# Patient Record
Sex: MALE | Race: BLACK OR AFRICAN AMERICAN | Employment: UNEMPLOYED | URBAN - METROPOLITAN AREA
[De-identification: names, ages, dates, MRNs, and addresses within clinical notes are randomized per-mention and may not be internally consistent; named-entity substitution may affect disease eponyms.]

---

## 2021-06-28 ENCOUNTER — HOSPITAL ENCOUNTER (EMERGENCY)
Age: 61
Discharge: HOME | End: 2021-06-28

## 2021-06-28 ENCOUNTER — APPOINTMENT (OUTPATIENT)
Dept: GENERAL RADIOLOGY | Age: 61
End: 2021-06-28

## 2021-06-28 VITALS
OXYGEN SATURATION: 98 % | DIASTOLIC BLOOD PRESSURE: 79 MMHG | HEART RATE: 64 BPM | RESPIRATION RATE: 18 BRPM | SYSTOLIC BLOOD PRESSURE: 152 MMHG | TEMPERATURE: 97.5 F

## 2021-06-28 DIAGNOSIS — M25.561 CHRONIC PAIN OF BOTH KNEES: Primary | ICD-10-CM

## 2021-06-28 DIAGNOSIS — G89.29 CHRONIC PAIN OF BOTH KNEES: Primary | ICD-10-CM

## 2021-06-28 DIAGNOSIS — M25.562 CHRONIC PAIN OF BOTH KNEES: Primary | ICD-10-CM

## 2021-06-28 PROCEDURE — 73562 X-RAY EXAM OF KNEE 3: CPT

## 2021-06-28 PROCEDURE — 99283 EMERGENCY DEPT VISIT LOW MDM: CPT | Performed by: NURSE PRACTITIONER

## 2021-06-28 PROCEDURE — 99283 EMERGENCY DEPT VISIT LOW MDM: CPT

## 2021-06-28 RX ORDER — IBUPROFEN 600 MG/1
600 TABLET ORAL 3 TIMES DAILY PRN
Qty: 30 TABLET | Refills: 0 | Status: SHIPPED | OUTPATIENT
Start: 2021-06-28

## 2021-06-28 ASSESSMENT — ENCOUNTER SYMPTOMS
FEVER: 0
VOMITING: 0
CONFUSION: 0
NAUSEA: 0
CHILLS: 0
CHEST TIGHTNESS: 0
HEADACHES: 0
ABDOMINAL PAIN: 0
SHORTNESS OF BREATH: 0
COUGH: 0
DIZZINESS: 0

## 2021-07-14 ENCOUNTER — APPOINTMENT (OUTPATIENT)
Dept: GENERAL RADIOLOGY | Age: 61
End: 2021-07-14

## 2021-07-14 LAB
ALBUMIN SERPL-MCNC: 4.1 G/DL (ref 3.6–5.1)
ALBUMIN/GLOB SERPL: 1.1 {RATIO} (ref 1–2.4)
ALP SERPL-CCNC: 99 UNITS/L (ref 45–117)
ALT SERPL-CCNC: 25 UNITS/L
ANION GAP SERPL CALC-SCNC: 8 MMOL/L (ref 10–20)
AST SERPL-CCNC: 20 UNITS/L
BASOPHILS # BLD: 0.1 K/MCL (ref 0–0.3)
BASOPHILS NFR BLD: 2 %
BILIRUB SERPL-MCNC: 0.5 MG/DL (ref 0.2–1)
BUN SERPL-MCNC: 11 MG/DL (ref 6–20)
BUN/CREAT SERPL: 9 (ref 7–25)
CALCIUM SERPL-MCNC: 9.1 MG/DL (ref 8.4–10.2)
CHLORIDE SERPL-SCNC: 107 MMOL/L (ref 98–107)
CO2 SERPL-SCNC: 27 MMOL/L (ref 21–32)
CREAT SERPL-MCNC: 1.28 MG/DL (ref 0.67–1.17)
DEPRECATED RDW RBC: 47.2 FL (ref 39–50)
EOSINOPHIL # BLD: 0.2 K/MCL (ref 0–0.5)
EOSINOPHIL NFR BLD: 6 %
ERYTHROCYTE [DISTWIDTH] IN BLOOD: 14.1 % (ref 11–15)
FASTING DURATION TIME PATIENT: ABNORMAL H
GFR SERPLBLD BASED ON 1.73 SQ M-ARVRAT: 69 ML/MIN/1.73M2
GLOBULIN SER-MCNC: 3.9 G/DL (ref 2–4)
GLUCOSE SERPL-MCNC: 87 MG/DL (ref 65–99)
HCT VFR BLD CALC: 47.5 % (ref 39–51)
HGB BLD-MCNC: 15.2 G/DL (ref 13–17)
IMM GRANULOCYTES # BLD AUTO: 0 K/MCL (ref 0–0.2)
IMM GRANULOCYTES # BLD: 1 %
LYMPHOCYTES # BLD: 0.8 K/MCL (ref 1–4)
LYMPHOCYTES NFR BLD: 24 %
MCH RBC QN AUTO: 29 PG (ref 26–34)
MCHC RBC AUTO-ENTMCNC: 32 G/DL (ref 32–36.5)
MCV RBC AUTO: 90.6 FL (ref 78–100)
MONOCYTES # BLD: 0.4 K/MCL (ref 0.3–0.9)
MONOCYTES NFR BLD: 12 %
NEUTROPHILS # BLD: 1.8 K/MCL (ref 1.8–7.7)
NEUTROPHILS NFR BLD: 55 %
NRBC BLD MANUAL-RTO: 0 /100 WBC
PLATELET # BLD AUTO: 190 K/MCL (ref 140–450)
POTASSIUM SERPL-SCNC: 3.8 MMOL/L (ref 3.4–5.1)
PROT SERPL-MCNC: 8 G/DL (ref 6.4–8.2)
RBC # BLD: 5.24 MIL/MCL (ref 4.5–5.9)
SODIUM SERPL-SCNC: 138 MMOL/L (ref 135–145)
TROPONIN I SERPL HS-MCNC: <0.02 NG/ML
WBC # BLD: 3.3 K/MCL (ref 4.2–11)

## 2021-07-14 PROCEDURE — C9803 HOPD COVID-19 SPEC COLLECT: HCPCS

## 2021-07-14 PROCEDURE — 93005 ELECTROCARDIOGRAM TRACING: CPT | Performed by: EMERGENCY MEDICINE

## 2021-07-14 PROCEDURE — 71046 X-RAY EXAM CHEST 2 VIEWS: CPT

## 2021-07-14 PROCEDURE — 85025 COMPLETE CBC W/AUTO DIFF WBC: CPT | Performed by: NURSE PRACTITIONER

## 2021-07-14 PROCEDURE — 80053 COMPREHEN METABOLIC PANEL: CPT | Performed by: NURSE PRACTITIONER

## 2021-07-14 PROCEDURE — 84484 ASSAY OF TROPONIN QUANT: CPT | Performed by: NURSE PRACTITIONER

## 2021-07-14 PROCEDURE — 93010 ELECTROCARDIOGRAM REPORT: CPT | Performed by: INTERNAL MEDICINE

## 2021-07-14 PROCEDURE — 99284 EMERGENCY DEPT VISIT MOD MDM: CPT

## 2021-07-14 PROCEDURE — 36415 COLL VENOUS BLD VENIPUNCTURE: CPT

## 2021-07-14 ASSESSMENT — PAIN DESCRIPTION - PAIN TYPE: TYPE: CHEST PAIN

## 2021-07-14 ASSESSMENT — PAIN SCALES - GENERAL: PAINLEVEL_OUTOF10: 7

## 2021-07-15 ENCOUNTER — HOSPITAL ENCOUNTER (EMERGENCY)
Age: 61
Discharge: HOME | End: 2021-07-15
Attending: EMERGENCY MEDICINE

## 2021-07-15 VITALS
RESPIRATION RATE: 16 BRPM | HEART RATE: 70 BPM | BODY MASS INDEX: 33.14 KG/M2 | DIASTOLIC BLOOD PRESSURE: 104 MMHG | SYSTOLIC BLOOD PRESSURE: 161 MMHG | WEIGHT: 231.48 LBS | HEIGHT: 70 IN | TEMPERATURE: 98.4 F | OXYGEN SATURATION: 99 %

## 2021-07-15 DIAGNOSIS — B97.89 VIRAL RESPIRATORY ILLNESS: Primary | ICD-10-CM

## 2021-07-15 DIAGNOSIS — J98.8 VIRAL RESPIRATORY ILLNESS: Primary | ICD-10-CM

## 2021-07-15 LAB
ATRIAL RATE (BPM): 76
P AXIS (DEGREES): 38
PR-INTERVAL (MSEC): 150
QRS-INTERVAL (MSEC): 96
QT-INTERVAL (MSEC): 358
QTC: 402
R AXIS (DEGREES): -29
RAINBOW EXTRA TUBES HOLD SPECIMEN: NORMAL
REPORT TEXT: NORMAL
SARS-COV-2 RNA RESP QL NAA+PROBE: NOT DETECTED
SERVICE CMNT-IMP: NORMAL
SERVICE CMNT-IMP: NORMAL
T AXIS (DEGREES): 34
VENTRICULAR RATE EKG/MIN (BPM): 76

## 2021-07-15 PROCEDURE — 99284 EMERGENCY DEPT VISIT MOD MDM: CPT | Performed by: STUDENT IN AN ORGANIZED HEALTH CARE EDUCATION/TRAINING PROGRAM

## 2021-07-15 PROCEDURE — U0005 INFEC AGEN DETEC AMPLI PROBE: HCPCS | Performed by: STUDENT IN AN ORGANIZED HEALTH CARE EDUCATION/TRAINING PROGRAM

## 2021-07-15 ASSESSMENT — ENCOUNTER SYMPTOMS
HEADACHES: 0
SHORTNESS OF BREATH: 1
COUGH: 1
CHILLS: 0
BLOOD IN STOOL: 0
BACK PAIN: 0
CONFUSION: 0
WOUND: 0
ABDOMINAL PAIN: 0
STRIDOR: 0
TROUBLE SWALLOWING: 0
RHINORRHEA: 0
SORE THROAT: 0
BRUISES/BLEEDS EASILY: 0
RHINORRHEA: 1
FATIGUE: 0
EYE DISCHARGE: 0
CHEST TIGHTNESS: 0
DIARRHEA: 0
LIGHT-HEADEDNESS: 0
DIZZINESS: 0
VOMITING: 0
EYE PAIN: 0
AGITATION: 0
NAUSEA: 0
FEVER: 0
SEIZURES: 0
CHEST TIGHTNESS: 1
POLYDIPSIA: 0

## 2022-07-09 ENCOUNTER — HOSPITAL ENCOUNTER (INPATIENT)
Facility: CLINIC | Age: 62
LOS: 4 days | Discharge: HOME | DRG: 897 | End: 2022-07-13
Attending: EMERGENCY MEDICINE | Admitting: STUDENT IN AN ORGANIZED HEALTH CARE EDUCATION/TRAINING PROGRAM

## 2022-07-09 ENCOUNTER — APPOINTMENT (OUTPATIENT)
Dept: GENERAL RADIOLOGY | Facility: CLINIC | Age: 62
DRG: 897 | End: 2022-07-09
Attending: EMERGENCY MEDICINE

## 2022-07-09 DIAGNOSIS — S22.32XA CLOSED FRACTURE OF ONE RIB OF LEFT SIDE, INITIAL ENCOUNTER: ICD-10-CM

## 2022-07-09 DIAGNOSIS — R56.9 SEIZURE (H): ICD-10-CM

## 2022-07-09 DIAGNOSIS — E87.1 HYPONATREMIA: ICD-10-CM

## 2022-07-09 DIAGNOSIS — F10.939 ALCOHOL WITHDRAWAL SYNDROME WITH COMPLICATION (H): ICD-10-CM

## 2022-07-09 LAB
ALBUMIN SERPL-MCNC: 4.4 G/DL (ref 3.4–5)
ALP SERPL-CCNC: 60 U/L (ref 40–150)
ALT SERPL W P-5'-P-CCNC: 105 U/L (ref 0–70)
ANION GAP SERPL CALCULATED.3IONS-SCNC: 16 MMOL/L (ref 3–14)
AST SERPL W P-5'-P-CCNC: 125 U/L (ref 0–45)
ATRIAL RATE - MUSE: 77 BPM
BASOPHILS # BLD AUTO: 0 10E3/UL (ref 0–0.2)
BASOPHILS NFR BLD AUTO: 1 %
BILIRUB DIRECT SERPL-MCNC: 0.6 MG/DL (ref 0–0.2)
BILIRUB SERPL-MCNC: 1.9 MG/DL (ref 0.2–1.3)
BUN SERPL-MCNC: 7 MG/DL (ref 7–30)
CALCIUM SERPL-MCNC: 8.8 MG/DL (ref 8.5–10.1)
CHLORIDE BLD-SCNC: 91 MMOL/L (ref 94–109)
CK SERPL-CCNC: 194 U/L (ref 30–300)
CO2 SERPL-SCNC: 16 MMOL/L (ref 20–32)
CREAT SERPL-MCNC: 0.54 MG/DL (ref 0.66–1.25)
DIASTOLIC BLOOD PRESSURE - MUSE: NORMAL MMHG
EOSINOPHIL # BLD AUTO: 0 10E3/UL (ref 0–0.7)
EOSINOPHIL NFR BLD AUTO: 1 %
ERYTHROCYTE [DISTWIDTH] IN BLOOD BY AUTOMATED COUNT: 11.6 % (ref 10–15)
GFR SERPL CREATININE-BSD FRML MDRD: >90 ML/MIN/1.73M2
GLUCOSE BLD-MCNC: 144 MG/DL (ref 70–99)
HCT VFR BLD AUTO: 36 % (ref 40–53)
HGB BLD-MCNC: 12.8 G/DL (ref 13.3–17.7)
HOLD SPECIMEN: NORMAL
HOLD SPECIMEN: NORMAL
IMM GRANULOCYTES # BLD: 0 10E3/UL
IMM GRANULOCYTES NFR BLD: 1 %
INTERPRETATION ECG - MUSE: NORMAL
LYMPHOCYTES # BLD AUTO: 0.4 10E3/UL (ref 0.8–5.3)
LYMPHOCYTES NFR BLD AUTO: 10 %
MAGNESIUM SERPL-MCNC: 2.2 MG/DL (ref 1.6–2.3)
MAGNESIUM SERPL-MCNC: 2.3 MG/DL (ref 1.6–2.3)
MCH RBC QN AUTO: 33.9 PG (ref 26.5–33)
MCHC RBC AUTO-ENTMCNC: 35.6 G/DL (ref 31.5–36.5)
MCV RBC AUTO: 95 FL (ref 78–100)
MONOCYTES # BLD AUTO: 0.7 10E3/UL (ref 0–1.3)
MONOCYTES NFR BLD AUTO: 17 %
NEUTROPHILS # BLD AUTO: 3 10E3/UL (ref 1.6–8.3)
NEUTROPHILS NFR BLD AUTO: 70 %
NRBC # BLD AUTO: 0 10E3/UL
NRBC BLD AUTO-RTO: 0 /100
P AXIS - MUSE: 74 DEGREES
PHOSPHATE SERPL-MCNC: 2.6 MG/DL (ref 2.5–4.5)
PHOSPHATE SERPL-MCNC: 3.1 MG/DL (ref 2.5–4.5)
PLATELET # BLD AUTO: 120 10E3/UL (ref 150–450)
POTASSIUM BLD-SCNC: 3.4 MMOL/L (ref 3.4–5.3)
POTASSIUM BLD-SCNC: 3.6 MMOL/L (ref 3.4–5.3)
PR INTERVAL - MUSE: 170 MS
PROT SERPL-MCNC: 7.8 G/DL (ref 6.8–8.8)
QRS DURATION - MUSE: 92 MS
QT - MUSE: 426 MS
QTC - MUSE: 482 MS
R AXIS - MUSE: 75 DEGREES
RBC # BLD AUTO: 3.78 10E6/UL (ref 4.4–5.9)
SARS-COV-2 RNA RESP QL NAA+PROBE: NEGATIVE
SODIUM SERPL-SCNC: 123 MMOL/L (ref 133–144)
SYSTOLIC BLOOD PRESSURE - MUSE: NORMAL MMHG
T AXIS - MUSE: 74 DEGREES
VENTRICULAR RATE- MUSE: 77 BPM
WBC # BLD AUTO: 4.3 10E3/UL (ref 4–11)

## 2022-07-09 PROCEDURE — HZ2ZZZZ DETOXIFICATION SERVICES FOR SUBSTANCE ABUSE TREATMENT: ICD-10-PCS | Performed by: STUDENT IN AN ORGANIZED HEALTH CARE EDUCATION/TRAINING PROGRAM

## 2022-07-09 PROCEDURE — 36415 COLL VENOUS BLD VENIPUNCTURE: CPT | Performed by: EMERGENCY MEDICINE

## 2022-07-09 PROCEDURE — 85025 COMPLETE CBC W/AUTO DIFF WBC: CPT | Performed by: EMERGENCY MEDICINE

## 2022-07-09 PROCEDURE — 71101 X-RAY EXAM UNILAT RIBS/CHEST: CPT | Mod: LT

## 2022-07-09 PROCEDURE — C9803 HOPD COVID-19 SPEC COLLECT: HCPCS

## 2022-07-09 PROCEDURE — 83735 ASSAY OF MAGNESIUM: CPT | Performed by: STUDENT IN AN ORGANIZED HEALTH CARE EDUCATION/TRAINING PROGRAM

## 2022-07-09 PROCEDURE — 93005 ELECTROCARDIOGRAM TRACING: CPT

## 2022-07-09 PROCEDURE — U0005 INFEC AGEN DETEC AMPLI PROBE: HCPCS | Performed by: EMERGENCY MEDICINE

## 2022-07-09 PROCEDURE — 36415 COLL VENOUS BLD VENIPUNCTURE: CPT | Performed by: STUDENT IN AN ORGANIZED HEALTH CARE EDUCATION/TRAINING PROGRAM

## 2022-07-09 PROCEDURE — 99223 1ST HOSP IP/OBS HIGH 75: CPT | Mod: AI | Performed by: STUDENT IN AN ORGANIZED HEALTH CARE EDUCATION/TRAINING PROGRAM

## 2022-07-09 PROCEDURE — 96375 TX/PRO/DX INJ NEW DRUG ADDON: CPT

## 2022-07-09 PROCEDURE — 84132 ASSAY OF SERUM POTASSIUM: CPT | Performed by: INTERNAL MEDICINE

## 2022-07-09 PROCEDURE — 250N000011 HC RX IP 250 OP 636: Performed by: EMERGENCY MEDICINE

## 2022-07-09 PROCEDURE — 84100 ASSAY OF PHOSPHORUS: CPT | Performed by: EMERGENCY MEDICINE

## 2022-07-09 PROCEDURE — 99285 EMERGENCY DEPT VISIT HI MDM: CPT | Mod: 25

## 2022-07-09 PROCEDURE — 84100 ASSAY OF PHOSPHORUS: CPT | Performed by: STUDENT IN AN ORGANIZED HEALTH CARE EDUCATION/TRAINING PROGRAM

## 2022-07-09 PROCEDURE — 250N000013 HC RX MED GY IP 250 OP 250 PS 637: Performed by: STUDENT IN AN ORGANIZED HEALTH CARE EDUCATION/TRAINING PROGRAM

## 2022-07-09 PROCEDURE — 96374 THER/PROPH/DIAG INJ IV PUSH: CPT

## 2022-07-09 PROCEDURE — 120N000001 HC R&B MED SURG/OB

## 2022-07-09 PROCEDURE — 82248 BILIRUBIN DIRECT: CPT | Performed by: EMERGENCY MEDICINE

## 2022-07-09 PROCEDURE — 83735 ASSAY OF MAGNESIUM: CPT | Performed by: EMERGENCY MEDICINE

## 2022-07-09 PROCEDURE — 82310 ASSAY OF CALCIUM: CPT | Performed by: EMERGENCY MEDICINE

## 2022-07-09 PROCEDURE — 82550 ASSAY OF CK (CPK): CPT | Performed by: EMERGENCY MEDICINE

## 2022-07-09 PROCEDURE — 258N000003 HC RX IP 258 OP 636: Performed by: STUDENT IN AN ORGANIZED HEALTH CARE EDUCATION/TRAINING PROGRAM

## 2022-07-09 RX ORDER — IBUPROFEN 600 MG/1
600 TABLET, FILM COATED ORAL EVERY 6 HOURS
Status: DISCONTINUED | OUTPATIENT
Start: 2022-07-09 | End: 2022-07-13 | Stop reason: HOSPADM

## 2022-07-09 RX ORDER — AMOXICILLIN 250 MG
1 CAPSULE ORAL 2 TIMES DAILY PRN
Status: DISCONTINUED | OUTPATIENT
Start: 2022-07-09 | End: 2022-07-13 | Stop reason: HOSPADM

## 2022-07-09 RX ORDER — NALOXONE HYDROCHLORIDE 0.4 MG/ML
0.2 INJECTION, SOLUTION INTRAMUSCULAR; INTRAVENOUS; SUBCUTANEOUS
Status: DISCONTINUED | OUTPATIENT
Start: 2022-07-09 | End: 2022-07-13 | Stop reason: HOSPADM

## 2022-07-09 RX ORDER — GABAPENTIN 300 MG/1
600 CAPSULE ORAL EVERY 8 HOURS
Status: DISCONTINUED | OUTPATIENT
Start: 2022-07-13 | End: 2022-07-11

## 2022-07-09 RX ORDER — HYDROMORPHONE HCL IN WATER/PF 6 MG/30 ML
0.2 PATIENT CONTROLLED ANALGESIA SYRINGE INTRAVENOUS
Status: DISCONTINUED | OUTPATIENT
Start: 2022-07-09 | End: 2022-07-13 | Stop reason: HOSPADM

## 2022-07-09 RX ORDER — FLUMAZENIL 0.1 MG/ML
0.2 INJECTION, SOLUTION INTRAVENOUS
Status: DISCONTINUED | OUTPATIENT
Start: 2022-07-09 | End: 2022-07-13 | Stop reason: HOSPADM

## 2022-07-09 RX ORDER — ONDANSETRON 2 MG/ML
4 INJECTION INTRAMUSCULAR; INTRAVENOUS EVERY 6 HOURS PRN
Status: DISCONTINUED | OUTPATIENT
Start: 2022-07-09 | End: 2022-07-13 | Stop reason: HOSPADM

## 2022-07-09 RX ORDER — AMOXICILLIN 250 MG
2 CAPSULE ORAL 2 TIMES DAILY PRN
Status: DISCONTINUED | OUTPATIENT
Start: 2022-07-09 | End: 2022-07-13 | Stop reason: HOSPADM

## 2022-07-09 RX ORDER — FOLIC ACID 1 MG/1
1 TABLET ORAL DAILY
Status: DISCONTINUED | OUTPATIENT
Start: 2022-07-10 | End: 2022-07-13 | Stop reason: HOSPADM

## 2022-07-09 RX ORDER — IBUPROFEN 600 MG/1
600 TABLET, FILM COATED ORAL EVERY 6 HOURS PRN
Status: DISCONTINUED | OUTPATIENT
Start: 2022-07-09 | End: 2022-07-13 | Stop reason: HOSPADM

## 2022-07-09 RX ORDER — ACETAMINOPHEN 325 MG/1
650 TABLET ORAL EVERY 6 HOURS PRN
Status: DISCONTINUED | OUTPATIENT
Start: 2022-07-09 | End: 2022-07-13 | Stop reason: HOSPADM

## 2022-07-09 RX ORDER — DIAZEPAM 10 MG/2ML
5-10 INJECTION, SOLUTION INTRAMUSCULAR; INTRAVENOUS EVERY 30 MIN PRN
Status: DISCONTINUED | OUTPATIENT
Start: 2022-07-09 | End: 2022-07-13 | Stop reason: HOSPADM

## 2022-07-09 RX ORDER — SODIUM CHLORIDE 9 MG/ML
INJECTION, SOLUTION INTRAVENOUS CONTINUOUS
Status: DISCONTINUED | OUTPATIENT
Start: 2022-07-09 | End: 2022-07-11

## 2022-07-09 RX ORDER — OXYCODONE HYDROCHLORIDE 5 MG/1
5 TABLET ORAL EVERY 4 HOURS PRN
Status: DISCONTINUED | OUTPATIENT
Start: 2022-07-09 | End: 2022-07-13 | Stop reason: HOSPADM

## 2022-07-09 RX ORDER — GABAPENTIN 300 MG/1
900 CAPSULE ORAL EVERY 8 HOURS
Status: DISCONTINUED | OUTPATIENT
Start: 2022-07-10 | End: 2022-07-11

## 2022-07-09 RX ORDER — PROCHLORPERAZINE MALEATE 10 MG
10 TABLET ORAL EVERY 6 HOURS PRN
Status: DISCONTINUED | OUTPATIENT
Start: 2022-07-09 | End: 2022-07-13 | Stop reason: HOSPADM

## 2022-07-09 RX ORDER — HALOPERIDOL 5 MG/ML
2.5-5 INJECTION INTRAMUSCULAR EVERY 6 HOURS PRN
Status: DISCONTINUED | OUTPATIENT
Start: 2022-07-09 | End: 2022-07-13 | Stop reason: HOSPADM

## 2022-07-09 RX ORDER — LEVETIRACETAM 500 MG/1
500 TABLET ORAL 2 TIMES DAILY
Status: DISCONTINUED | OUTPATIENT
Start: 2022-07-09 | End: 2022-07-13 | Stop reason: HOSPADM

## 2022-07-09 RX ORDER — GABAPENTIN 600 MG/1
1200 TABLET ORAL ONCE
Status: COMPLETED | OUTPATIENT
Start: 2022-07-09 | End: 2022-07-09

## 2022-07-09 RX ORDER — OLANZAPINE 5 MG/1
5-10 TABLET, ORALLY DISINTEGRATING ORAL EVERY 6 HOURS PRN
Status: DISCONTINUED | OUTPATIENT
Start: 2022-07-09 | End: 2022-07-13 | Stop reason: HOSPADM

## 2022-07-09 RX ORDER — PROCHLORPERAZINE 25 MG
25 SUPPOSITORY, RECTAL RECTAL EVERY 12 HOURS PRN
Status: DISCONTINUED | OUTPATIENT
Start: 2022-07-09 | End: 2022-07-13 | Stop reason: HOSPADM

## 2022-07-09 RX ORDER — DIAZEPAM 5 MG
10 TABLET ORAL EVERY 30 MIN PRN
Status: DISCONTINUED | OUTPATIENT
Start: 2022-07-09 | End: 2022-07-13 | Stop reason: HOSPADM

## 2022-07-09 RX ORDER — GABAPENTIN 100 MG/1
100 CAPSULE ORAL EVERY 8 HOURS
Status: DISCONTINUED | OUTPATIENT
Start: 2022-07-17 | End: 2022-07-11

## 2022-07-09 RX ORDER — METHOCARBAMOL 500 MG/1
500 TABLET, FILM COATED ORAL EVERY 6 HOURS PRN
Status: ACTIVE | OUTPATIENT
Start: 2022-07-09 | End: 2022-07-12

## 2022-07-09 RX ORDER — ONDANSETRON 4 MG/1
4 TABLET, ORALLY DISINTEGRATING ORAL EVERY 6 HOURS PRN
Status: DISCONTINUED | OUTPATIENT
Start: 2022-07-09 | End: 2022-07-13 | Stop reason: HOSPADM

## 2022-07-09 RX ORDER — NALOXONE HYDROCHLORIDE 0.4 MG/ML
0.4 INJECTION, SOLUTION INTRAMUSCULAR; INTRAVENOUS; SUBCUTANEOUS
Status: DISCONTINUED | OUTPATIENT
Start: 2022-07-09 | End: 2022-07-13 | Stop reason: HOSPADM

## 2022-07-09 RX ORDER — MORPHINE SULFATE 4 MG/ML
4 INJECTION, SOLUTION INTRAMUSCULAR; INTRAVENOUS ONCE
Status: COMPLETED | OUTPATIENT
Start: 2022-07-09 | End: 2022-07-09

## 2022-07-09 RX ORDER — LIDOCAINE 40 MG/G
CREAM TOPICAL
Status: DISCONTINUED | OUTPATIENT
Start: 2022-07-09 | End: 2022-07-13 | Stop reason: HOSPADM

## 2022-07-09 RX ORDER — CLONIDINE HYDROCHLORIDE 0.1 MG/1
0.1 TABLET ORAL EVERY 8 HOURS
Status: DISCONTINUED | OUTPATIENT
Start: 2022-07-09 | End: 2022-07-11

## 2022-07-09 RX ORDER — DIAZEPAM 10 MG/2ML
5 INJECTION, SOLUTION INTRAMUSCULAR; INTRAVENOUS EVERY 5 MIN PRN
Status: DISCONTINUED | OUTPATIENT
Start: 2022-07-09 | End: 2022-07-13 | Stop reason: HOSPADM

## 2022-07-09 RX ORDER — ACETAMINOPHEN 325 MG/1
975 TABLET ORAL EVERY 8 HOURS
Status: DISCONTINUED | OUTPATIENT
Start: 2022-07-10 | End: 2022-07-13 | Stop reason: HOSPADM

## 2022-07-09 RX ORDER — MULTIPLE VITAMINS W/ MINERALS TAB 9MG-400MCG
1 TAB ORAL DAILY
Status: DISCONTINUED | OUTPATIENT
Start: 2022-07-10 | End: 2022-07-13 | Stop reason: HOSPADM

## 2022-07-09 RX ORDER — ACETAMINOPHEN 650 MG/1
650 SUPPOSITORY RECTAL EVERY 6 HOURS PRN
Status: DISCONTINUED | OUTPATIENT
Start: 2022-07-09 | End: 2022-07-13 | Stop reason: HOSPADM

## 2022-07-09 RX ORDER — POLYETHYLENE GLYCOL 3350 17 G/17G
17 POWDER, FOR SOLUTION ORAL DAILY PRN
Status: DISCONTINUED | OUTPATIENT
Start: 2022-07-09 | End: 2022-07-13 | Stop reason: HOSPADM

## 2022-07-09 RX ORDER — LIDOCAINE 4 G/G
1 PATCH TOPICAL EVERY 24 HOURS
Status: DISCONTINUED | OUTPATIENT
Start: 2022-07-09 | End: 2022-07-13 | Stop reason: HOSPADM

## 2022-07-09 RX ORDER — GABAPENTIN 300 MG/1
300 CAPSULE ORAL EVERY 8 HOURS
Status: DISCONTINUED | OUTPATIENT
Start: 2022-07-15 | End: 2022-07-11

## 2022-07-09 RX ADMIN — LIDOCAINE 1 PATCH: 560 PATCH PERCUTANEOUS; TOPICAL; TRANSDERMAL at 23:03

## 2022-07-09 RX ADMIN — DIAZEPAM 5 MG: 5 INJECTION, SOLUTION INTRAMUSCULAR; INTRAVENOUS at 17:59

## 2022-07-09 RX ADMIN — OXYCODONE HYDROCHLORIDE 5 MG: 5 TABLET ORAL at 21:19

## 2022-07-09 RX ADMIN — SODIUM CHLORIDE: 9 INJECTION, SOLUTION INTRAVENOUS at 23:00

## 2022-07-09 RX ADMIN — CLONIDINE HYDROCHLORIDE 0.1 MG: 0.1 TABLET ORAL at 23:00

## 2022-07-09 RX ADMIN — IBUPROFEN 600 MG: 600 TABLET ORAL at 23:07

## 2022-07-09 RX ADMIN — MORPHINE SULFATE 4 MG: 4 INJECTION, SOLUTION INTRAMUSCULAR; INTRAVENOUS at 17:57

## 2022-07-09 RX ADMIN — GABAPENTIN 1200 MG: 600 TABLET, FILM COATED ORAL at 23:01

## 2022-07-09 RX ADMIN — LEVETIRACETAM 500 MG: 500 TABLET, FILM COATED ORAL at 20:35

## 2022-07-09 ASSESSMENT — ACTIVITIES OF DAILY LIVING (ADL)
ADLS_ACUITY_SCORE: 35
DRESSING/BATHING_DIFFICULTY: NO
FALL_HISTORY_WITHIN_LAST_SIX_MONTHS: NO
WALKING_OR_CLIMBING_STAIRS_DIFFICULTY: NO
ADLS_ACUITY_SCORE: 35
DIFFICULTY_EATING/SWALLOWING: NO
TOILETING_ISSUES: NO
CHANGE_IN_FUNCTIONAL_STATUS_SINCE_ONSET_OF_CURRENT_ILLNESS/INJURY: YES
WEAR_GLASSES_OR_BLIND: NO
CONCENTRATING,_REMEMBERING_OR_MAKING_DECISIONS_DIFFICULTY: NO
DOING_ERRANDS_INDEPENDENTLY_DIFFICULTY: NO
ADLS_ACUITY_SCORE: 35

## 2022-07-09 ASSESSMENT — ENCOUNTER SYMPTOMS
ARTHRALGIAS: 1
SEIZURES: 1

## 2022-07-09 NOTE — ED NOTES
"[facility]  ED Nurse Handoff Report    ED Chief complaint: Seizures      ED Diagnosis:   Final diagnoses:   Seizure (H)       Code Status: Full Code    Allergies: No Known Allergies    Patient Story: Patient was at the airport heading back home to [place] when he had a seizure. Bystanders lowered him to the ground.  Focused Assessment:  Patient is confused and doesn't remember events leading up to event, the event, or after the event. He's very forgetful. He has redness in BLE left >right. He endorses left rib pain. There are scattered bruises over his body especially LUE.     Treatments and/or interventions provided: Labs drawn and medications given. Monitoring patient for any events. Continuous vital signs done.  Patient's response to treatments and/or interventions: Tolerating well. Patient has a tremor that appears to be worsening. Valium given and bear hugger with warm air placed on patient.    To be done/followed up on inpatient unit:  Monitor for seizures and alcohol withdrawal. Follow plan of care.    Does this patient have any cognitive concerns?: Short term memory loss    Activity level - Baseline/Home:  Independent  Activity Level - Current:   Stand with Assist    Patient's Preferred language: English   Needed?: No    Isolation: None  Infection: Not Applicable  Patient tested for COVID 19 prior to admission: YES  Bariatric?: No    Vital Signs:   Vitals:    07/09/22 1620 07/09/22 1639 07/09/22 1700   BP: (!) 153/90 133/80 130/84   Pulse: 95 89 79   Resp: 18     Temp: 98.3  F (36.8  C)     TempSrc: Oral     SpO2: 96% 96% 94%   Weight: 61.2 kg (135 lb)     Height: 1.803 m (5' 11\")         Cardiac Rhythm:     Was the PSS-3 completed:   Yes  What interventions are required if any?               Family Comments: N/A  OBS brochure/video discussed/provided to patient/family: No              Name of person given brochure if not patient:               Relationship to patient:     For the " majority of the shift this patient's behavior was Yellow for confusion and forgetfulness.   Behavioral interventions performed were N/A.    ED NURSE PHONE NUMBER: [phone]

## 2022-07-09 NOTE — ED NOTES
Bed: ED09  Expected date:   Expected time:   Means of arrival:   Comments:  Abhijeet 522 61 M seizure alert ETA [id number]

## 2022-07-09 NOTE — ED PROVIDER NOTES
History   Chief Complaint:  Seizures     The history is provided by the EMS personnel.      Jose Stovall is a 61 year old male with history of epilepsy who presents via EMS with seizure. EMS states that the patient was at the [place] airport about to board his flight home to [place] when he started seizing. They report that he was lowered to the floor by bystanders. They state that upon their arrival the patient had stopped seizing after 5 minutes and was post-ictal with right-sided facial weakness. They add he was aphasic. They report that once they got the patient into the vehicle he started to come around. They add that his blood sugar was 128 and his speech was slurred. They add that once he arrived to the ED he was oriented x4. They note that he has a history of epilepsy and is not taking any medications for this. The patient adds that his last seizure was 7 years ago. He adds that he develops theses seizures when he quits drinking. The patient adds that he developed left rib pain which is exacerbated by a slight cough.    Review of Systems   Musculoskeletal: Positive for arthralgias (Left rib).   Neurological: Positive for seizures.   All other systems reviewed and are negative.    Allergies:  No Known Allergies    Medications:  The patient is currently on no regular medications.    Past Medical History:     Epilepsy    Past Surgical History:    No pertinent surgical history.     Social History:  Patient is from [place], [place]  Presents to ED via EMS    Physical Exam     Patient Vitals for the past 24 hrs:   BP Temp Temp src Pulse Resp SpO2 Height Weight   07/09/22 2250 123/77 99.5  F (37.5  C) Oral 67 18 98 % -- --   07/09/22 2228 (!) 116/90 -- -- -- -- -- -- --   07/09/22 2226 -- -- -- 69 -- 98 % -- --   07/09/22 1830 (!) 140/90 -- -- 68 -- 98 % -- --   07/09/22 1800 (!) 144/87 -- -- 77 -- 99 % -- --   07/09/22 1730 (!) 143/95 -- -- 74 -- 100 % -- --   07/09/22 1700 130/84 -- -- 79 -- 94 % -- --  "  07/09/22 1639 133/80 -- -- 89 -- 96 % -- --   07/09/22 1620 (!) 153/90 98.3  F (36.8  C) Oral 95 18 96 % 1.803 m (5' 11\") 61.2 kg (135 lb)     Physical Exam   General: Alert, No distress. Nontoxic appearance.  Head: No signs of trauma.   Mouth/Throat: Oropharynx moist. Bite wound to left-side of tongue  Eyes: Conjunctivae are normal. Pupils are equal..   Neck: Normal range of motion.    CV: Appears well perfused.  Resp:No respiratory distress.   MSK: Normal range of motion. No obvious deformity. Left-sided rib pain  Neuro: The patient is alert and interactive. MEDRANO. Speech normal. GCS 15  Skin: No lesion or sign of trauma noted. Erythema over left leg including toes and left lower leg  Psych: normal mood and affect. behavior is normal.     Emergency Department Course   ECG  ECG taken at 1711, ECG read at 1718  No pacemaker  Sinus rhythm with premature atrial complexes with aberrant conduction   Rate 77 bpm. MS interval 170 ms. QRS duration 92 ms. QT/QTc 426/482 ms. P-R-T axes 74 75 74.     Imaging:  Ribs XR, unilat 3 views + PA chest,  left   Final Result   IMPRESSION:    1. The cardiomediastinal silhouette and pulmonary vasculature appear normal.   2. Small amount of streaky opacity in the left lung base consistent with atelectasis or infiltrate.   3. Minimally displaced lateral left eighth and ninth rib fractures. Small amount of left pleural fluid. No pneumothorax.        Report per radiology    Laboratory:  Labs Ordered and Resulted from Time of ED Arrival to Time of ED Departure   BASIC METABOLIC PANEL - Abnormal       Result Value    Sodium 123 (*)     Potassium 3.4      Chloride 91 (*)     Carbon Dioxide (CO2) 16 (*)     Anion Gap 16 (*)     Urea Nitrogen 7      Creatinine 0.54 (*)     Calcium 8.8      Glucose 144 (*)     GFR Estimate >90     CBC WITH PLATELETS AND DIFFERENTIAL - Abnormal    WBC Count 4.3      RBC Count 3.78 (*)     Hemoglobin 12.8 (*)     Hematocrit 36.0 (*)     MCV 95      MCH 33.9 (*)     " MCHC 35.6      RDW 11.6      Platelet Count 120 (*)     % Neutrophils 70      % Lymphocytes 10      % Monocytes 17      % Eosinophils 1      % Basophils 1      % Immature Granulocytes 1      NRBCs per 100 WBC 0      Absolute Neutrophils 3.0      Absolute Lymphocytes 0.4 (*)     Absolute Monocytes 0.7      Absolute Eosinophils 0.0      Absolute Basophils 0.0      Absolute Immature Granulocytes 0.0      Absolute NRBCs 0.0     HEPATIC FUNCTION PANEL - Abnormal    Bilirubin Total 1.9 (*)     Bilirubin Direct 0.6 (*)     Protein Total 7.8      Albumin 4.4      Alkaline Phosphatase 60       (*)      (*)    CK TOTAL - Normal         MAGNESIUM - Normal    Magnesium 2.3     PHOSPHORUS - Normal    Phosphorus 2.6     COVID-19 VIRUS (CORONAVIRUS) BY PCR - Normal    SARS CoV2 PCR Negative        Emergency Department Course:     Reviewed:  I reviewed nursing notes, vitals, past medical history and Care Everywhere    Assessments:  1730 I obtained history and examined the patient as noted above.   1845 I rechecked the patient and explained findings.     Consults:  1857 I consulted with Dr. Mccracken, hospitalist, who accepts the patient's admission.    Interventions:  1757 Morphine, 4 mg, IV  1759 Diazepam, 5 mg, IV  2035 Levetiracetam, 500 mg, PO  2119 Oxycodone, 5 mg, PO    Disposition:  The patient was admitted to the hospital under the care of Dr. Mccracken.     Impression & Plan     Medical Decision Making:  Jose Stovall is a 61 year old male with a PMH significant for a seizure disorder who presents for evaluation of a spell consistent with a seizure. A broad differential diagnosis was considered for the seizure today including alcohol withdrawal, medicine non-compliance, low or high levels of anti-epileptic, intracranial bleed, stroke, tumor, hypoglycemia, cerebral edema, metabolic derangement, cardiac arrhythmia, etc.  The patient has no signs of concerning etiologies of seizure or seizure-mimics at  this time.    The head to toe trauma exam is negative except for his left-sided tongue biting and left-sided chest wall pain and x-ray that indicates 2 rib fracture; there are no signs of serious sequelae of trauma at this time such as spinal fractures, dislocations, etc.  Because the seizure was likely secondary to alcohol withdrawal, the patient will be admitted to the hospital for further evaluation and treatment      Diagnosis:    ICD-10-CM    1. Seizure (H)  R56.9    2. Alcohol withdrawal syndrome with complication (H)  F10.239    3. Closed fracture of one rib of left side, initial encounter  S22.32XA    4. Hyponatremia  E87.1      Scribe Disclosure:  I, Ra Jorgensen, am serving as a scribe at 5:22 PM on 7/9/2022 to document services personally performed by Lopez Hoskins MD based on my observations and the provider's statements to me.        Lopez Hoskins MD  07/10/22 0056

## 2022-07-09 NOTE — ED NOTES
RN notes left lower leg redness when performing EKG. RN also notes right top of foot redness.    Patient complaints of left sided rib pain when sat up after 12 lead EKG performed. RN inspected ribs. No bruising, redness, deformity or injury seen. Patient tolerated palpation to upper and lower lateral ribs, but complains of pain with palpation over middle lateral ribs.    MD Joing updated.

## 2022-07-09 NOTE — ED TRIAGE NOTES
EMS REPORT:    Patient was traveling from [place]. Left this morning.  Patent was at [place] airport when he was witnessed to have a seizure. Patient was lowered to the ground by a bystander. No injuries to tongue or mouth. No loss of control of bowel or bladder. Patient was initially somnolent upon Fire/EMS arrival. Patient had global aphasia and was postictal. Patient had large amounts of oral secretions. Patient had right sided facial weakness. Patient was loaded into ambulance where he started to come around. . 18ga PIV in LFA. History of epilepsy as adult. No longer on anti-seizure medications. No seizures for years.    Patient is now Awake alert and oriented to person, place, time and situation. Patient moves all four extremities equally bilaterally. Pupils are equal round and reactive to light. Patient denies any pain or lightheadedness. Denies any dizziness. Patient denies any urinary symptoms. Denies any chronic health problems.      Triage Assessment     Row Name 07/09/22 0244       Triage Assessment (Adult)    Airway WDL WDL       Respiratory WDL    Respiratory WDL WDL       Skin Circulation/Temperature WDL    Skin Circulation/Temperature WDL WDL       Cardiac WDL    Cardiac WDL WDL       Peripheral/Neurovascular WDL    Peripheral Neurovascular WDL WDL       Cognitive/Neuro/Behavioral WDL    Cognitive/Neuro/Behavioral WDL WDL

## 2022-07-09 NOTE — H&P
[facility]    History and Physical - Hospitalist Service       Date of Admission:  7/9/2022    Assessment & Plan      Jose Stovall is a 61 year old male admitted on 7/9/2022.     Seizure, likely 2/2 EtOH withdrawal  Hx of EtOH withdrawal seizure  EtOH use disorder  - inpatient  - CIWA with diazepam  - vitamins  - keppra x7 days  -CT head    Elevated LFTs, likely EtOH hepatitis  - RUQ ultrasound  - recheck LFTs    Left 8th and 9th rib fracture, acute  - PRN robaxin, oxycodone, lidocaine patch  - IS, pulmonary toilet  - monitor  - PT/OT    Thrombocytopenia, mild  - suspect related to liver dz  - recheck    Hyponatremia  - check urine studies  - recheck in AM   - gentle IVF       Diet:   regular  DVT Prophylaxis: Pneumatic Compression Devices  Colón Catheter: Not present  Central Lines: None  Cardiac Monitoring: None  Code Status:   FULL    Clinically Significant Risk Factors Present on Admission         # Hyponatremia: Na = 123 mmol/L (Ref range: 133 - 144 mmol/L) on admission, will monitor as appropriate        # Thrombocytopenia: Plts = 120 10e3/uL (Ref range: 150 - 450 10e3/uL) on admission, will monitor for bleeding           Disposition Plan         The patient's care was discussed with the Bedside Nurse, Patient and ED Team.    Corey Mccracken MD  Hospitalist Service  [facility]  Securely message with the Vocera Web Console (learn more here)  Text page via [employer] Paging/Directory         ______________________________________________________________________    Chief Complaint   Seizure and chest pain    History is obtained from the patient, electronic health record and emergency department physician    History of Present Illness   Jose Stovall is a 61 year old male who has a history of alcohol withdrawal seizures presents to the hospital on 7/9/2022 after having a seizure at the [place] airport during layover.    Patient was visiting [place] from the  [facility] to be with family for the week.  On his flight home he decided not to drink that day.  While he was in the airport he was feeling very stressed because he thought he had lost his bag.  The next thing he knows he was on the ground being lifted by by EMS.  He reportedly was seen to have a shaking spell lasting approximately 5 minutes.  No loss of bowel or bladder function, but he did bite his tongue.  He was notably postictal for most of the EMS ride, but on arrival to the emergency department he was alert and oriented.    Reports in typically drinks 2-3 tall boys daily.  Denies any hard liquor use.  Denies any other illicit substance use.    On interview, he endorses left-sided chest pain that is worse with deep breathing.  He does not feel like he is withdrawing from any substances currently.    Review of Systems    The 10 point Review of Systems is negative other than noted in the HPI or here.     Past Medical History    I have reviewed this patient's medical history and updated it with pertinent information if needed.   Past Medical History:   Diagnosis Date     Seizure disorder (H)        Past Surgical History   I have reviewed this patient's surgical history and updated it with pertinent information if needed.  History reviewed. No pertinent surgical history.    Social History   I have reviewed this patient's social history and updated it with pertinent information if needed.  Social History     Tobacco Use     Smoking status: Current Every Day Smoker     Packs/day: 0.25     Types: Cigarettes     Smokeless tobacco: Never Used   Substance Use Topics     Drug use: Never       Family History     No reported history of sudden cardiac death    Prior to Admission Medications   None     Allergies   No Known Allergies    Physical Exam   Vital Signs: Temp: 98.3  F (36.8  C) Temp src: Oral BP: (!) 140/90 Pulse: 68   Resp: 18 SpO2: 98 % O2 Device: None (Room air)    Weight: 135 lbs 0 oz    Constitutional:  Awake, alert, cooperative, no apparent cardiopulmonary distress.  Eyes: Conjunctiva and pupils examined and normal.  HEENT: Moist mucous membranes, normal dentition.  Respiratory: Clear to auscultation bilaterally, no crackles or wheezing.  Cardiovascular: Regular rate and rhythm, normal S1 and S2, and no murmur noted.  GI: Soft, non-distended, non-tender, normal bowel sounds.  Lymph/Hematologic: No anterior cervical or supraclavicular adenopathy.  Skin: No rashes, no cyanosis, no edema noted on exposed skin.  Musculoskeletal: No joint swelling, erythema or tenderness. No gross bony abnormalities  Neurologic: Cranial nerves 2-12 grossly intact, normal strength and sensation.  Psychiatric: Alert, oriented to person, place and time, no obvious anxiety or depression.      Data   Data reviewed today: I reviewed all medications, new labs and imaging results over the last 24 hours. I personally reviewed the EKG tracing showing nsr and the chest x-ray image(s) showing left 8 an 9 rib fracture.    Recent Labs   Lab 07/09/22  1637   WBC 4.3   HGB 12.8*   MCV 95   *   *   POTASSIUM 3.4   CHLORIDE 91*   CO2 16*   BUN 7   CR 0.54*   ANIONGAP 16*   JEAN-CLAUDE 8.8   *   ALBUMIN 4.4   PROTTOTAL 7.8   BILITOTAL 1.9*   ALKPHOS 60   *   *     Recent Results (from the past 24 hour(s))   Ribs XR, unilat 3 views + PA chest,  left    Narrative    EXAM: XR RIBS and CHEST LT 3VW  LOCATION: [facility]  DATE/TIME: 7/9/2022 6:21 PM    INDICATION: Pain after fall  COMPARISON: None.      Impression    IMPRESSION:   1. The cardiomediastinal silhouette and pulmonary vasculature appear normal.  2. Small amount of streaky opacity in the left lung base consistent with atelectasis or infiltrate.  3. Minimally displaced lateral left eighth and ninth rib fractures. Small amount of left pleural fluid. No pneumothorax.

## 2022-07-10 ENCOUNTER — APPOINTMENT (OUTPATIENT)
Dept: PHYSICAL THERAPY | Facility: CLINIC | Age: 62
DRG: 897 | End: 2022-07-10
Attending: STUDENT IN AN ORGANIZED HEALTH CARE EDUCATION/TRAINING PROGRAM

## 2022-07-10 ENCOUNTER — APPOINTMENT (OUTPATIENT)
Dept: ULTRASOUND IMAGING | Facility: CLINIC | Age: 62
DRG: 897 | End: 2022-07-10
Attending: STUDENT IN AN ORGANIZED HEALTH CARE EDUCATION/TRAINING PROGRAM

## 2022-07-10 ENCOUNTER — APPOINTMENT (OUTPATIENT)
Dept: MRI IMAGING | Facility: CLINIC | Age: 62
DRG: 897 | End: 2022-07-10
Attending: PSYCHIATRY & NEUROLOGY

## 2022-07-10 LAB
ALBUMIN SERPL-MCNC: 3.5 G/DL (ref 3.4–5)
ALP SERPL-CCNC: 44 U/L (ref 40–150)
ALT SERPL W P-5'-P-CCNC: 77 U/L (ref 0–70)
ANION GAP SERPL CALCULATED.3IONS-SCNC: 9 MMOL/L (ref 3–14)
AST SERPL W P-5'-P-CCNC: 72 U/L (ref 0–45)
BILIRUB SERPL-MCNC: 2.7 MG/DL (ref 0.2–1.3)
BUN SERPL-MCNC: 9 MG/DL (ref 7–30)
CALCIUM SERPL-MCNC: 8.6 MG/DL (ref 8.5–10.1)
CHLORIDE BLD-SCNC: 98 MMOL/L (ref 94–109)
CO2 SERPL-SCNC: 22 MMOL/L (ref 20–32)
CREAT SERPL-MCNC: 0.59 MG/DL (ref 0.66–1.25)
ERYTHROCYTE [DISTWIDTH] IN BLOOD BY AUTOMATED COUNT: 11.8 % (ref 10–15)
GFR SERPL CREATININE-BSD FRML MDRD: >90 ML/MIN/1.73M2
GLUCOSE BLD-MCNC: 110 MG/DL (ref 70–99)
HCT VFR BLD AUTO: 32.3 % (ref 40–53)
HGB BLD-MCNC: 11.4 G/DL (ref 13.3–17.7)
MAGNESIUM SERPL-MCNC: 2.2 MG/DL (ref 1.6–2.3)
MCH RBC QN AUTO: 34.3 PG (ref 26.5–33)
MCHC RBC AUTO-ENTMCNC: 35.3 G/DL (ref 31.5–36.5)
MCV RBC AUTO: 97 FL (ref 78–100)
OSMOLALITY SERPL: 265 MMOL/KG (ref 280–301)
OSMOLALITY UR: 260 MMOL/KG (ref 100–1200)
PHOSPHATE SERPL-MCNC: 3.3 MG/DL (ref 2.5–4.5)
PLATELET # BLD AUTO: 111 10E3/UL (ref 150–450)
POTASSIUM BLD-SCNC: 3.5 MMOL/L (ref 3.4–5.3)
PROT SERPL-MCNC: 6.9 G/DL (ref 6.8–8.8)
RBC # BLD AUTO: 3.32 10E6/UL (ref 4.4–5.9)
SODIUM SERPL-SCNC: 129 MMOL/L (ref 133–144)
SODIUM UR-SCNC: 24 MMOL/L
WBC # BLD AUTO: 4.3 10E3/UL (ref 4–11)

## 2022-07-10 PROCEDURE — 80053 COMPREHEN METABOLIC PANEL: CPT | Performed by: STUDENT IN AN ORGANIZED HEALTH CARE EDUCATION/TRAINING PROGRAM

## 2022-07-10 PROCEDURE — 76705 ECHO EXAM OF ABDOMEN: CPT

## 2022-07-10 PROCEDURE — 83735 ASSAY OF MAGNESIUM: CPT | Performed by: STUDENT IN AN ORGANIZED HEALTH CARE EDUCATION/TRAINING PROGRAM

## 2022-07-10 PROCEDURE — 97530 THERAPEUTIC ACTIVITIES: CPT | Mod: GP | Performed by: PHYSICAL THERAPIST

## 2022-07-10 PROCEDURE — A9585 GADOBUTROL INJECTION: HCPCS | Performed by: STUDENT IN AN ORGANIZED HEALTH CARE EDUCATION/TRAINING PROGRAM

## 2022-07-10 PROCEDURE — 258N000003 HC RX IP 258 OP 636: Performed by: STUDENT IN AN ORGANIZED HEALTH CARE EDUCATION/TRAINING PROGRAM

## 2022-07-10 PROCEDURE — 84100 ASSAY OF PHOSPHORUS: CPT | Performed by: STUDENT IN AN ORGANIZED HEALTH CARE EDUCATION/TRAINING PROGRAM

## 2022-07-10 PROCEDURE — 83930 ASSAY OF BLOOD OSMOLALITY: CPT | Performed by: INTERNAL MEDICINE

## 2022-07-10 PROCEDURE — 99233 SBSQ HOSP IP/OBS HIGH 50: CPT | Performed by: INTERNAL MEDICINE

## 2022-07-10 PROCEDURE — 85027 COMPLETE CBC AUTOMATED: CPT | Performed by: STUDENT IN AN ORGANIZED HEALTH CARE EDUCATION/TRAINING PROGRAM

## 2022-07-10 PROCEDURE — 97116 GAIT TRAINING THERAPY: CPT | Mod: GP | Performed by: PHYSICAL THERAPIST

## 2022-07-10 PROCEDURE — 250N000013 HC RX MED GY IP 250 OP 250 PS 637: Performed by: STUDENT IN AN ORGANIZED HEALTH CARE EDUCATION/TRAINING PROGRAM

## 2022-07-10 PROCEDURE — 120N000001 HC R&B MED SURG/OB

## 2022-07-10 PROCEDURE — 97161 PT EVAL LOW COMPLEX 20 MIN: CPT | Mod: GP | Performed by: PHYSICAL THERAPIST

## 2022-07-10 PROCEDURE — 255N000002 HC RX 255 OP 636: Performed by: STUDENT IN AN ORGANIZED HEALTH CARE EDUCATION/TRAINING PROGRAM

## 2022-07-10 PROCEDURE — 84300 ASSAY OF URINE SODIUM: CPT | Performed by: INTERNAL MEDICINE

## 2022-07-10 PROCEDURE — 999N000157 HC STATISTIC RCP TIME EA 10 MIN

## 2022-07-10 PROCEDURE — 70553 MRI BRAIN STEM W/O & W/DYE: CPT

## 2022-07-10 PROCEDURE — 83935 ASSAY OF URINE OSMOLALITY: CPT | Performed by: INTERNAL MEDICINE

## 2022-07-10 PROCEDURE — 36415 COLL VENOUS BLD VENIPUNCTURE: CPT | Performed by: INTERNAL MEDICINE

## 2022-07-10 PROCEDURE — 94150 VITAL CAPACITY TEST: CPT

## 2022-07-10 RX ORDER — GADOBUTROL 604.72 MG/ML
6 INJECTION INTRAVENOUS ONCE
Status: COMPLETED | OUTPATIENT
Start: 2022-07-10 | End: 2022-07-10

## 2022-07-10 RX ADMIN — IBUPROFEN 600 MG: 600 TABLET ORAL at 17:51

## 2022-07-10 RX ADMIN — THIAMINE HCL TAB 100 MG 100 MG: 100 TAB at 08:10

## 2022-07-10 RX ADMIN — MELATONIN 5 MG TABLET 5 MG: at 00:11

## 2022-07-10 RX ADMIN — OXYCODONE HYDROCHLORIDE 5 MG: 5 TABLET ORAL at 15:44

## 2022-07-10 RX ADMIN — FOLIC ACID 1 MG: 1 TABLET ORAL at 08:10

## 2022-07-10 RX ADMIN — LEVETIRACETAM 500 MG: 500 TABLET, FILM COATED ORAL at 08:10

## 2022-07-10 RX ADMIN — CLONIDINE HYDROCHLORIDE 0.1 MG: 0.1 TABLET ORAL at 22:48

## 2022-07-10 RX ADMIN — IBUPROFEN 600 MG: 600 TABLET ORAL at 05:35

## 2022-07-10 RX ADMIN — GABAPENTIN 900 MG: 300 CAPSULE ORAL at 14:28

## 2022-07-10 RX ADMIN — CLONIDINE HYDROCHLORIDE 0.1 MG: 0.1 TABLET ORAL at 08:10

## 2022-07-10 RX ADMIN — SODIUM CHLORIDE: 9 INJECTION, SOLUTION INTRAVENOUS at 18:38

## 2022-07-10 RX ADMIN — LEVETIRACETAM 500 MG: 500 TABLET, FILM COATED ORAL at 20:34

## 2022-07-10 RX ADMIN — ACETAMINOPHEN 975 MG: 325 TABLET ORAL at 00:10

## 2022-07-10 RX ADMIN — GADOBUTROL 6 ML: 604.72 INJECTION INTRAVENOUS at 21:23

## 2022-07-10 RX ADMIN — IBUPROFEN 600 MG: 600 TABLET ORAL at 11:56

## 2022-07-10 RX ADMIN — OXYCODONE HYDROCHLORIDE 5 MG: 5 TABLET ORAL at 21:38

## 2022-07-10 RX ADMIN — GABAPENTIN 900 MG: 300 CAPSULE ORAL at 21:38

## 2022-07-10 RX ADMIN — MULTIPLE VITAMINS W/ MINERALS TAB 1 TABLET: TAB at 08:10

## 2022-07-10 RX ADMIN — ACETAMINOPHEN 975 MG: 325 TABLET ORAL at 15:44

## 2022-07-10 RX ADMIN — IBUPROFEN 600 MG: 600 TABLET ORAL at 22:48

## 2022-07-10 RX ADMIN — CLONIDINE HYDROCHLORIDE 0.1 MG: 0.1 TABLET ORAL at 14:28

## 2022-07-10 RX ADMIN — POLYETHYLENE GLYCOL 3350 17 G: 17 POWDER, FOR SOLUTION ORAL at 15:58

## 2022-07-10 RX ADMIN — ACETAMINOPHEN 975 MG: 325 TABLET ORAL at 08:10

## 2022-07-10 RX ADMIN — MELATONIN 5 MG TABLET 5 MG: at 22:48

## 2022-07-10 RX ADMIN — GABAPENTIN 900 MG: 300 CAPSULE ORAL at 05:35

## 2022-07-10 ASSESSMENT — ACTIVITIES OF DAILY LIVING (ADL)
ADLS_ACUITY_SCORE: 20

## 2022-07-10 NOTE — PROGRESS NOTES
RT met with patient to perform NIF and VC.     NIF: -50 cmH2O  VC: 4.08 L     Both were performed with good effort.    RT to continue to follow    Alexander Willis, RT on 7/10/2022 at 2:50 PM

## 2022-07-10 NOTE — PROVIDER NOTIFICATION
"MD Notification    Notified Person: MD    Notified Person Name: Cielo Callaway MD     Notification Date/Time: 710/22 at 1856    Notification Interaction: [facility] Answering Service     Purpose of Notification: \"Can you order MRI of brain\"     Orders Received: MRI and EEG ordered.     Comments:       "

## 2022-07-10 NOTE — PLAN OF CARE
Goal Outcome Evaluation:    Summary: Witnessed [place] at the airport. Hyponatremia   DATE & TIME: 7/10/2022 1731-6224  Cognitive Concerns/ Orientation : A&Ox4; forgetful.   BEHAVIOR & AGGRESSION TOOL COLOR: Green  CIWA SCORE: 1,1   ABNL VS/O2: VSS on RA  MOBILITY: Assist of 1 with GB, walker  PAIN MANAGMENT: Scheduled Tylenol, Advil, Lidoderm patch (patch free period), has PRN Oxycodone available for Left ribs pain. C/o rib pain only when elevating arms or repositioning in bed  DIET: Regular  BOWEL/BLADDER: Continent  ABNL LAB/BG: ALT 77 AST 72 -  CXR showed Left 8th and 9th rib fractures. DRAIN/DEVICES: PIV  TELEMETRY RHYTHM: NA  SKIN: Bruises, scabs, redness to BLE  TESTS/PROCEDURES: Abdominal US   D/C DAY/GOALS/PLACE: Pending  OTHER IMPORTANT INFO: Seizure precautions in place

## 2022-07-10 NOTE — PROGRESS NOTES
07/10/22 1458   Quick Adds   Type of Visit Initial PT Evaluation   Living Environment   People in Home alone   Current Living Arrangements hotel/motel   Home Accessibility stairs to enter home   Number of Stairs, Main Entrance 10   Stair Railings, Main Entrance railings safe and in good condition   Transportation Anticipated family or friend will provide   Living Environment Comments Per pt. he used to live with his mother who has since passed away.  States she had WW and SEC but sister has taken over all of the mother's estate and pt. has blocked sister's calls as they are at odds.  Pt. reports he was here visiting his ex-wife, who is his best friend, and her family in [place] for the last week [facility] [place] [facility] [place]  In [place] he has lived in a motel with a flight of 10 steps for the last two months.  States he is going to call to see if he can move to the bottom floor.  Also states he can stay with son and daughter-in-law in [place] when he returns.  Has two sons who live in same town and assist with getting groceries as pt. doesn't drive.   Self-Care   Usual Activity Tolerance good   Current Activity Tolerance moderate   Regular Exercise Yes   Activity/Exercise Type walking   Exercise Amount/Frequency daily   Equipment Currently Used at Home none   Fall history within last six months yes   Number of times patient has fallen within last six months 2  (Prior to admit at airport with seizure; subsequent L rib 8th and 9th fxs.  Reports several months ago also fell on bottom of steps.  Is unsure what happened.  Reports he always uses rail and is always concerned with steps.)   Activity/Exercise/Self-Care Comment Independent with ambulation without AD; I with ADL's   General Information   Onset of Illness/Injury or Date of Surgery 07/09/22   Referring Physician Corey Mccracken MD   Patient/Family Therapy Goals Statement (PT) Plans to have local family he was visiting take him to the  airport when he is discharged.  Plans to discharge directly to airport and return to [place]; possibly to son and daughter-in-laws home.   Pertinent History of Current Problem (include personal factors and/or comorbidities that impact the POC) Jose Stovall is a 61 year old male with history of epilepsy who presents via EMS with seizure. EMS states that the patient was at the [place] airport about to board his flight home to [place] when he started seizing. They report that he was lowered to the floor by bystanders. They state that upon their arrival the patient had stopped seizing after 5 minutes and was post-ictal with right-sided facial weakness. They add he was aphasic. They report that once they got the patient into the vehicle he started to come around. They add that his blood sugar was 128 and his speech was slurred. They add that once he arrived to the ED he was oriented x4. They note that he has a history of epilepsy and is not taking any medications for this. The patient adds that his last seizure was 7 years ago. He adds that he develops theses seizures when he quits drinking. The patient adds that he developed left rib pain which is exacerbated by a slight cough   Existing Precautions/Restrictions fall;seizures   General Observations Lying in bed; on his cellphone; agreeable to participate   Cognition   Affect/Mental Status (Cognition) WFL   Orientation Status (Cognition) oriented x 4   Follows Commands (Cognition) follows one-step commands;75-90% accuracy   Safety Deficit (Cognition) impulsivity;insight into deficits/self-awareness   Pain Assessment   Patient Currently in Pain Yes, see Vital Sign flowsheet  (L ribs)   Integumentary/Edema   Integumentary/Edema no deficits were identifed   Posture    Posture Forward head position   Range of Motion (ROM)   Range of Motion ROM is WFL   Strength (Manual Muscle Testing)   Strength Comments antigravity movement noted throughout limbs.  Generally L/E's 4/5.   Painful at ribs with limb movement.   Bed Mobility   Comment, (Bed Mobility) Supine to sit with SBA.Increased effort/pain   Transfers   Comment, (Transfers) Sit to stand with CGA and increased effort/pain   Gait/Stairs (Locomotion)   Comment, (Gait/Stairs) Ambulated 10' for eval without AD with close CGA to occasional slight Min A with lateral deviation.  CGA 10' with WW.   Balance   Balance Comments Compromised, especially without AD minimally though noticably; requiring pt. to reach for wall occasionally.  More steady with WW. Still requiring CGA.   Sensory Examination   Sensory Perception patient reports no sensory changes   Coordination   Coordination Comments mild incoordination with occasional lateral deviation; pt. feels like he occasionall deviates to L.  Therapist observed occasional right deviation.  Unsure if this is close to baseline given ETOH history and its effects.   Muscle Tone   Muscle Tone no deficits were identified   Clinical Impression   Criteria for Skilled Therapeutic Intervention Yes, treatment indicated   PT Diagnosis (PT) Mildly Impaired functional mobility   Influenced by the following impairments Fatigue, deconditioning, balance deficits   Functional limitations due to impairments decreased independence and endurance in functional mobility   Clinical Presentation (PT Evaluation Complexity) Evolving/Changing   Clinical Presentation Rationale per clinical judgment   Clinical Decision Making (Complexity) low complexity   Planned Therapy Interventions (PT) bed mobility training;balance training;gait training;home exercise program;patient/family education;stair training;transfer training;home program guidelines   Anticipated Equipment Needs at Discharge (PT) walker, rolling  (pt. concerned as he does not have insurance; educated that it would be with entire hospital bill)   Risk & Benefits of therapy have been explained evaluation/treatment results reviewed;care plan/treatment goals  reviewed;risks/benefits reviewed;current/potential barriers reviewed;participants voiced agreement with care plan;participants included;patient   PT Discharge Planning   PT Discharge Recommendation (DC Rec) home with assist;home with home care physical therapy   PT Rationale for DC Rec Patient is currently mobilizing with close CGA and mild balance deficits especially when not using WW but slight deficits even when using.  Given pain in left ribs contributing to slight imbalance, would highly recommend 24/7 assist at this time for all mobilit.  Feel pt. is at risk for falling again without CGA support.  Pt. reports he can stay with son and daughter-in-law when he gets back to [place]. States son works; is unsure if daughter-in-law works.  Pt. is also planning on calling the motel he is staying at to see if he can be moved to main floor as he is currently on 2nd floor with 10 steps to climb.  Pt. would need wc and transfer assistance to safely travel at airport/airplane at this time.Discussed pt. returning to friends/famiy's home in [place] for a short time to allow for healing of ribs.  Pt. states this is not an option on his end as he doesn't want to outstay his welcome even though they would likely be good with him doing that.  If discharged to home, recommend home PT to assess pt. in his own setting.   PT Brief overview of current status '   Physical Therapy Goals   PT Frequency Daily   PT Predicted Duration/Target Date for Goal Attainment 07/13/22   PT: Bed Mobility Independent;Supine to/from sit;Within precautions   PT: Transfers Modified independent;Bed to/from chair;Sit to/from stand;Assistive device   PT: Gait Modified independent;Rolling walker;150 feet   PT: Stairs 10 stairs;Modified independent;Rail on left

## 2022-07-10 NOTE — CARE PLAN
Admission    Patient arrives to room 618 via cart from ED.  Care plan note: A&Ox4; forgetful. VSS on RA. Up with assist of 1, gait belt, walker. C/o Left rib pain, managing with scheduled Tylenol, Advil, Lidoderm patch. Has PRN Oxycodone available q4hrs    Inpatient nursing criteria listed below were met:    Did you put disposition on whiteboard and in sticky note: Yes  Full skin assessment done (add LDA if skin issue present) :Yes  Isolation education started/completed NA  Patient allergies verified with patient: Yes  Fall Risk? (Care plan updated, Education given and documented) Yes  Primary Care Plan initiated: Yes  Home medications documented in belongings flowsheet: NA  Patient belongings documented in belongings flowsheet: Yes  Reminder note (belongings/ medications) placed in discharge instructions:Yes  Admission profile/ required documentation complete: Yes  If patient is a 72 hour hold/Commitment are belongings removed from room and locked up? NA

## 2022-07-10 NOTE — CONSULTS
[facility]    Neurology Consultation    Jose Stovall MRN# [id number]   YOB: 1960 Age: 61 year old    Code Status:Full Code   Date of Admission: 7/9/2022  Date of Consult:07/10/2022                                                                                       Assessment and Plan:                                         #.   -- Mr. Stovall is a 61-year-old gentleman who admits to alcohol abuse, indicated that he has 3-4 drinks per day, usually beer, with a history for alcohol related seizures who indicated that he had not had a drink for about 24 hours when while at the airport he had a witnessed generalized seizure and was brought to the ED.  #.   -- Alcohol withdrawal seizure, also, contributing factors, patient indicated that he was sleep deprived, and presents with hyponatremia.    --- Rib fractures, traumatic, related to the above.  --Hyponatremia elevated LFTs, concerns regarding alcohol hepatitis.  #.   -- Recommendations  ---MRI of the brain with and without contrast  --Continue seizure precautions  --- Continue Keppra 500 mg twice daily.  --- Continue slow correction of hyponatremia    --- Vitamin replacement  --Alcohol withdrawal seizure appropriate protocol.  --Vital signs with neurochecks every 4 hours.    --Routine EEG.        Reason for consult: This neurological consultation was requested by Dr. Solo to assess this patient who presented with a generalized seizure, suspected alcohol withdrawal related.       Chief Complaint:   The information was obtained from the patient, and review of his health records.  Assessment indicated that he was at the [facility] when he apparently had a witnessed, generalized seizure and was brought to [facility] ED by EMS and admitted on 7/9/2022 .       History of Present Illness:   This patient is a 61 year old male who who indicated that he was at the [facility] to board a flight home to [place] and while there, he  started having seizure activity and was lowered to the floor by bystanders, witnesses stated that he stopped seizing after 5 minutes and was postictal with right-sided facial weakness.  Speech was noted to be slurred following the spell and when he arrived to the ED his mental status had improved.  The patient recalled waiting at the airport and  the next thing he knew he was in the ED here at [facility].  He admitted that he does drink alcohol regularly, at least 3-4 beers daily but because of his planned trip he had abstained the day before.  He has a history for seizures, he stated that these were probably alcohol related, but that his last seizure was about 78 years ago.  He indicated that at some point, and his history, he had been on Keppra but it had been discontinued by his physicians.  He has a history for childhood seizures, no history for ischemic strokes or meningitis or encephalitis or traumatic brain injury.  While in the ED he was complaining of left-sided chest pains and x-rays revealed a small amount of streaky opacity in the left lung base consistent with atelectasis, or infiltrate, minimally displaced lateral left eighth and ninth rib fractures were also noted.  Noncontrast CT scan of the head was not done in the ED.  Routine labs revealed sodium 129, potassium 5.3, calcium 8.6, magnesium 2.2, CK1 94, , AST 72, ammonia level was pending       Past Medical History:     Past Medical History:   Diagnosis Date     Seizure disorder (H)          Past Surgical History:   History reviewed. No pertinent surgical history.       Social History:     Social History     Socioeconomic History     Marital status:      Spouse name: None     Number of children: None     Years of education: None     Highest education level: None   Tobacco Use     Smoking status: Current Every Day Smoker     Packs/day: 0.25     Types: Cigarettes     Smokeless tobacco: Never Used   Substance and Sexual Activity      Drug use: Never     Patient denies smoking, history for daily alcohol use, denies illicit drugs use       Family History:   History reviewed. No pertinent family history.  Reviewed and not felt to be contributory.        Home Medications:     Prior to Admission Medications   Prescriptions Last Dose Informant Patient Reported? Taking?   MELATONIN PO   Yes Yes   Sig: Take 2 tablets by mouth At Bedtime      Facility-Administered Medications: None          Allergy:   No Known Allergies       Inpatient Medications:   Scheduled Meds:    acetaminophen  975 mg Oral Q8H     cloNIDine  0.1 mg Oral Q8H     folic acid  1 mg Oral Daily     [START ON 7/17/2022] gabapentin  100 mg Oral Q8H     [START ON 7/15/2022] gabapentin  300 mg Oral Q8H     [START ON 7/13/2022] gabapentin  600 mg Oral Q8H     gabapentin  900 mg Oral Q8H     ibuprofen  600 mg Oral Q6H     levETIRAcetam  500 mg Oral BID     lidocaine  1 patch Transdermal Q24H     lidocaine   Transdermal Q8H TERRANCE     multivitamin w/minerals  1 tablet Oral Daily     sodium chloride (PF)  3 mL Intracatheter Q8H     thiamine  100 mg Oral Daily     PRN Meds: acetaminophen **OR** acetaminophen, diazepam, diazepam **OR** diazepam, flumazenil, OLANZapine zydis **OR** haloperidol lactate, HYDROmorphone, ibuprofen, lidocaine 4%, lidocaine (buffered or not buffered), melatonin, methocarbamol, naloxone **OR** naloxone **OR** naloxone **OR** naloxone, ondansetron **OR** ondansetron, oxyCODONE, polyethylene glycol, prochlorperazine **OR** prochlorperazine **OR** prochlorperazine, senna-docusate **OR** senna-docusate, sodium chloride (PF)        Review of Systems    The Review of Systems is negative other than noted in the HPI  CONSTITUTIONAL: negative for fever, chills, change in weight  INTEGUMENTARY/SKIN: no rash or obvious new lesions  ENT/MOUTH: no sore throat, new sinus pain or nasal drainage, no neck mass noted  RESP: No pleuretic pain, No cough, no hemoptysis, No SOB   CV: negative  "for chest pain, palpitations or peripheral edema  GI: no nausea, vomiting, change in stools  : no dysuria or hematuria  MUSCULOSKELETAL: no myalgias, arthralgias or join efffusion  ENDOCRINE: no history of polyuria, polydyspsia or symptoms of thyroid dysfunction  PSYCHIATRIC: no change in mood stable  LYMPHATIC: no new lymphadenopathy  HEME: no bleeding or easy bruisability  NEURO: see HPI       Physical Exam:   Physical Exam   Vitals:  Height:5' 11\"  Weight:135 lbs 0 oz   Temp: 99.5  F (37.5  C) Temp src: Oral BP: 113/75 Pulse: 61   Resp: 16 SpO2: 98 % O2 Device: None (Room air)    General Appearance:  No acute distress he was normocephalic and had no signs of acute head or neck trauma.  Neuro:       Mental Status Exam:   He was drowsy, but easily aroused and became interactive with fluent speech and was oriented to time place and person, complaining of chest discomfort.  Fund of knowledge was appropriate.       Cranial Nerves:   Pupils are equal and briskly reactive to light, there was no ptosis, visual fields are intact to confrontation with full extraocular movements without nystagmoid movements and cranial nerves II through XII are intact.          Motor: There was normal muscle bulk, and tone, in all 4 extremities and there were no dystonic movements or tremors.  He had excellent strength, 5/5, proximally and distally in both the upper and lower extremities.          Reflexes: 2+/4 in biceps, triceps and brachioradialis, at the patella and ankles bilaterally, plantars were downgoing.       Sensory: Except for decreased appreciation to vibratory sensation, primary modalities were intact.                   Coordination:   There was no dysmetria to finger-to-nose or heel-to-shin testing       Gait: Deferred as the patient was complaining of chest discomfort  Neck: no nuchal rigidity, normal thyroid. No carotid bruits.    Cardiovascular: Regular rate and rhythm, no m/r/g  Lungs: Clear to auscultation  Abdomen: " Soft, not tender, not distended  Extremities: No clubbing, no cyanosis, no edema       Data:   ROUTINE IP LABS   CBC RESULTS:     Recent Labs   Lab 07/10/22  1124 07/09/22  1637   WBC 4.3 4.3   RBC 3.32* 3.78*   HGB 11.4* 12.8*   HCT 32.3* 36.0*   * 120*     Basic Metabolic Panel:   Recent Labs   Lab Test 07/10/22  1124 07/09/22  2247 07/09/22  1637   *  --  123*   POTASSIUM 3.5 3.6 3.4   CHLORIDE 98  --  91*   CO2 22  --  16*   BUN 9  --  7   CR 0.59*  --  0.54*   *  --  144*   JEAN-CLAUDE 8.6  --  8.8     Liver panel:  Recent Labs   Lab Test 07/10/22  1124 07/09/22  1637   PROTTOTAL 6.9 7.8   ALBUMIN 3.5 4.4   BILITOTAL 2.7* 1.9*   ALKPHOS 44 60   AST 72* 125*   ALT 77* 105*     Lipid Profile:No lab results found.  Thyroid Panel:No lab results found.   Vitamin B12: No lab results found.        IMAGING:   All imaging studies were reviewed personally    MRI brain:   -- Pending    Time: 60 minutes evaluation and management.  More than 50% of the time was spent in counseling and directing care.  Thank you for allowing me to participate in the care of this patient, do not hesitate to contact me if I can be of further assistance you.    Cielo Callaway M.D.  [facility], [facility].  Office [phone]

## 2022-07-10 NOTE — PHARMACY-ADMISSION MEDICATION HISTORY
Pharmacy Medication History  Admission medication history interview status for the 7/9/2022  admission is complete. See EPIC admission navigator for prior to admission medications     Location of Interview: Patient room  Medication history sources: Patient    Significant changes made to the medication list:  Added Melatonin.    In the past week, patient estimated taking medication this percent of the time: 50-90% due to other    Additional medication history information:   Patient reported that he did not know what strength Melatonin that he takes.    Medication reconciliation completed by provider prior to medication history? No    Time spent in this activity: 10 minutes    Prior to Admission medications    Medication Sig Last Dose Taking? Auth Provider Long Term End Date   MELATONIN PO Take 2 tablets by mouth At Bedtime  Yes Unknown, Entered By History         The information provided in this note is only as accurate as the sources available at the time of update(s)

## 2022-07-10 NOTE — PLAN OF CARE
Goal Outcome Evaluation:  Plan of Care Reviewed With: patient   Overall Patient Progress: improving  Cognitive Concerns/ Orientation : A&Ox4; forgetful.   BEHAVIOR & AGGRESSION TOOL COLOR: Green  CIWA SCORE: 3, 1 (mild tremors and anxiety )  ABNL VS/O2: VSS on RA  MOBILITY: Assist of 1 with GB, walker  PAIN MANAGMENT: Scheduled Tylenol, Advil, Lidoderm patch, and PRN Oxycodone for Left ribs pain. C/o left hand pain; ice pack applied; helpful.  DIET: Regular(NPO now for 8 hrs for abdominal US)  BOWEL/BLADDER: Continent  ABNL LAB/BG:   CXR showed Left 8th and 9th rib fractures   DRAIN/DEVICES: PIV  TELEMETRY RHYTHM: NA  SKIN: Bruises, scabs, redness to BLE  TESTS/PROCEDURES: Abdominal US in am  D/C DAY/GOALS/PLACE: Pending  OTHER IMPORTANT INFO: Seizure precautions in place

## 2022-07-10 NOTE — UTILIZATION REVIEW
"  Admission Status; Secondary Review Determination     Admission Date: 7/9/2022  4:15 PM      Under the authority of the Utilization Management Committee, the utilization review process indicated a secondary review on the above patient.  The review outcome is based on review of the medical records, discussions with staff, and applying clinical experience noted on the date of the review.        (x)      Inpatient Status Appropriate - This patient's medical care is consistent with medical management for inpatient care and reasonable inpatient medical practice.      () Observation Status Appropriate - This patient does not meet hospital inpatient criteria and is placed in observation status. If this patient's primary payer is Medicare and was admitted as an inpatient, Condition Code 44 should be used and patient status changed to \"observation\".   () Admission Status NOT Appropriate - This patient's medical care is not consistent with medical management for Inpatient or Observation Status.          RATIONALE FOR DETERMINATION   Jose Stovall is a 61 year old male with history including alcohol use d/o with h/o withdrawal seizure, who presented 7/9/2022 with seizure and found with sodium 123 and with left sided rib fracture's.  He has been treated for acute alcohol withdrawals with gabapentin and clonidine.  He has required diazepam as well.  He is on Keppra for seizures.  Currently on continuous IV fluid to treat his hyponatremia.  He is expected to require a prolonged hospital stay.  Due to this patient's complex past medical history, hyponatremia requiring IV fluids, alcohol withdrawals requiring gabapentin, clonidine, and diazepam, seizures requiring Keppra, and expectation of prolonged hospital stay, it is appropriate to have him admitted to the hospital as an inpatient.      The severity of illness, intensity of service provided, expected LOS and risk for adverse outcome make the care complex, high risk and " appropriate for hospital admission.        The information on this document is developed by the utilization review team in order for the business office to ensure compliance.  This only denotes the appropriateness of proper admission status and does not reflect the quality of care rendered.         The definitions of Inpatient Status and Observation Status used in making the determination above are those provided in the CMS Coverage Manual, Chapter 1 and Chapter 6, section 70.4.      Sincerely,     Mirza Toure D.O.  Utilization Review/ Case Management  [facility].

## 2022-07-10 NOTE — PROGRESS NOTES
"CLINICAL NUTRITION SERVICES BRIEF NOTE  Nutrition Admission Risk Screen Received -   Have you recently lost weight without trying in the last 6 months ? - \"yes 24-33 pounds (states lost 30 lbs over past 3 year\"\"  Have you been eating poorly due to a decreased appetite ?- \"No\"    New Findings  - Weight history reviewed -     61.2 kg (135 lb) 07/09/22   63.5 kg (140 lb) 05/26/2021     60.8 kg (134 lb) 02/15/2019    Weights appear relatively stable for the past 3 years.     Intervention  - Doesn't meet criteria for full nutrition assessment. Will review again at [facility], unless consulted.     Elsa Braswell RD, LD  [facility], [facility], [facility]  Pager: [phone]  Weekend Pager: [phone]      "

## 2022-07-10 NOTE — PROGRESS NOTES
"[facility]    Internal Medicine Hospitalist Progress Note  07/10/2022  I evaluated patient on the above date.    Jesus Solo Jr., MD  [phone] (p)  Text Page  Vocera        Assessment & Plan New actions/orders today (07/10/2022) are underlined.    Jose Stovall is a 61 year old male with history including alcohol use d/o with h/o withdrawal seizure, who presented 7/9/2022 with seizure and found with sodium 123 and with left sided rib fracture's,     Seizure, suspect 2/2 EtOH withdrawal with hyponatremia.  Hx of EtOH withdrawal seizures.  EtOH use disorder.  * Prior h/o seizures 7-8 years ago; however, per pt this seemed to be associated with stopping drinking; was previously on meds; no seizures for 7 years.  * Presented from [facility] [place] with seizure (was going back home to [place], [place], after visiting family in [place]). Typically drinks 2-3 tall boys daily; did not drink on the day of admit.  * Started on CIWA on admit. Started on 7d course of levetiracetam on admit.  - Continue CIWA with PRN diazepam.  - Continue gabapentin with taper per protocol.  - Continue scheduled clonidine per protocol.  - Continue vitamins.  - Continue levetiracetam.  - Check head CT.  - Neurology consult, appreciate help.  - Treat hyponatremia as noted.    Hyponatremia, possibly related to chronic EtOH (\"beer potomania\").  * Sodium 123 on admit. Started on NS on admit.  Recent Labs   Lab 07/09/22  1637   *   - Check serum osmol, urine osmol, urine sodium.  - Continue NS at 100 ml/hr.  - Monitor BMP.    Elevated LFTs, suspect alcoholic hepatitis.  * LFT's on admit 7/9 showed TBili 1.9, ALP 60, , .  Recent Labs   Lab 07/09/22  1637   ALBUMIN 4.4   BILITOTAL 1.9*   *   *   ALKPHOS 60   - RUQ US results pending.  - Monitor LFT's.    Acute left 8th and 9th rib fracture, suspect traumatic related to seizure.  * Per [place]. Pt c/o left " chest pain in the ED. Rib/chest x-ray 7/9 showed small amount of streaky opacity in the left lung base consistent with atelectasis or infiltrate; minimally displaced lateral left eighth and ninth rib fractures; small amount of left pleural fluid; no pneumothorax.  * Started on scheduled acetaminophen, scheduled ibuprofen, lidocaine patch on admit.  - Check head CT.  - Continue acetaminophen 975 mg q8h; ibuprofen 600 mg q6h; lidocaine patch 4% q24h; PRN acetaminophen; PRN methocarbamol 500 mg q6h; PRN oxycodone 5 mg q4h, PRN IV hydromorphone 0.2 mg q2h; minimize opioids as able.  - Continue IS, pulmonary toilet.  - PT, OT consulted.    HTN, possibly related to alcohol withdrawal.  * Started on clonidine per EtOH w/d protocol.  - Continue clonidine 0.1 mg q8h.  - Continue to treat EtOH w/d as noted.    Thrombocytopenia, mild, suspect related to liver disease.  * Plts 120K on admit 7/9.  Recent Labs   Lab 07/09/22  1637   *   - Monitor CBC.    Anemia, suspect chronic component.  * Hgb 12.8 on admit. No overt clinical signs of major bleeding.  Recent Labs   Lab 07/09/22  1637   HGB 12.8*   - Monitor CBC.    Clinically Significant Risk Factors Present on Admission         # Hyponatremia: Na = 123 mmol/L (Ref range: 133 - 144 mmol/L) on admission, will monitor as appropriate        # Thrombocytopenia: Plts = 120 10e3/uL (Ref range: 150 - 450 10e3/uL) on admission, will monitor for bleeding             COVID-19 testing.  COVID-19 PCR Results    COVID-19 PCR Results 7/9/22   SARS CoV2 PCR Negative      Comments are available for some flowsheets but are not being displayed.         COVID-19 Antibody Results, Testing for Immunity    COVID-19 Antibody Results, Testing for Immunity   No data to display.             Diet: Combination Diet Regular Diet Adult    Prophylaxis: PCD's, ambulation.   Colón Catheter: Not present  Central Lines: None  Code Status: Full Code    Disposition Plan   Expected discharge: 1-2d  "recommended to prior living arrangement pending above.  Entered: Jesus Solo MD 07/10/2022, 7:31 AM     I spent approximately 35 minutes bedside and on the inpatient unit today managing the care of patient. Over 50% of my time on the unit was spent in extensive chart review, counseling the patient/family and/or coordinating care regarding services listed in this note.      Interval History   Feeling better.  Pain overall controlled relatively well, but hurting more presently.    -Data reviewed today: I reviewed all new labs and imaging over the last 24 hours. I personally reviewed no images or EKG's today.    Physical Exam    , Blood pressure 123/77, pulse 67, temperature 99.5  F (37.5  C), temperature source Oral, resp. rate 18, height 1.803 m (5' 11\"), weight 61.2 kg (135 lb), SpO2 98 %. O2 Device: None (Room air)    Vitals:    07/09/22 1620   Weight: 61.2 kg (135 lb)     Vital Signs with Ranges  Temp:  [98.3  F (36.8  C)-99.5  F (37.5  C)] 99.5  F (37.5  C)  Pulse:  [67-95] 67  Resp:  [18] 18  BP: (116-153)/(77-95) 123/77  SpO2:  [94 %-100 %] 98 %  Patient Vitals for the past 24 hrs:   BP Temp Temp src Pulse Resp SpO2 Height Weight   07/09/22 2250 123/77 99.5  F (37.5  C) Oral 67 18 98 % -- --   07/09/22 2228 (!) 116/90 -- -- -- -- -- -- --   07/09/22 2226 -- -- -- 69 -- 98 % -- --   07/09/22 1830 (!) 140/90 -- -- 68 -- 98 % -- --   07/09/22 1800 (!) 144/87 -- -- 77 -- 99 % -- --   07/09/22 1730 (!) 143/95 -- -- 74 -- 100 % -- --   07/09/22 1700 130/84 -- -- 79 -- 94 % -- --   07/09/22 1639 133/80 -- -- 89 -- 96 % -- --   07/09/22 1620 (!) 153/90 98.3  F (36.8  C) Oral 95 18 96 % 1.803 m (5' 11\") 61.2 kg (135 lb)     I/O's Last 24 hours  I/O last 3 completed shifts:  In: -   Out: 150 [Urine:150]    Constitutional: Awake, alert, pleasant.  Respiratory: Diminished in bases. No crackles or wheezes.  Cardiovascular: RRR, no m/r/g.  GI: Soft, nt, nd, +BS.  Skin/Integumen:   Other:        Data   Recent Labs "   Lab 07/09/22  2247 07/09/22  1637   WBC  --  4.3   HGB  --  12.8*   MCV  --  95   PLT  --  120*   NA  --  123*   POTASSIUM 3.6 3.4   CHLORIDE  --  91*   CO2  --  16*   BUN  --  7   CR  --  0.54*   ANIONGAP  --  16*   JEAN-CLAUDE  --  8.8   GLC  --  144*   ALBUMIN  --  4.4   PROTTOTAL  --  7.8   BILITOTAL  --  1.9*   ALKPHOS  --  60   ALT  --  105*   AST  --  125*     Recent Labs   Lab Test 07/09/22  1637   *     Recent Labs   Lab 07/09/22  1637   WBC 4.3         Recent Results (from the past 24 hour(s))   Ribs XR, unilat 3 views + PA chest,  left    Narrative    EXAM: XR RIBS and CHEST LT 3VW  LOCATION: [facility]  DATE/TIME: 7/9/2022 6:21 PM    INDICATION: Pain after fall  COMPARISON: None.      Impression    IMPRESSION:   1. The cardiomediastinal silhouette and pulmonary vasculature appear normal.  2. Small amount of streaky opacity in the left lung base consistent with atelectasis or infiltrate.  3. Minimally displaced lateral left eighth and ninth rib fractures. Small amount of left pleural fluid. No pneumothorax.       Medications   All medications were reviewed.    sodium chloride 100 mL/hr at 07/09/22 2300       acetaminophen  975 mg Oral Q8H     cloNIDine  0.1 mg Oral Q8H     folic acid  1 mg Oral Daily     [START ON 7/17/2022] gabapentin  100 mg Oral Q8H     [START ON 7/15/2022] gabapentin  300 mg Oral Q8H     [START ON 7/13/2022] gabapentin  600 mg Oral Q8H     gabapentin  900 mg Oral Q8H     ibuprofen  600 mg Oral Q6H     levETIRAcetam  500 mg Oral BID     lidocaine  1 patch Transdermal Q24H     lidocaine   Transdermal Q8H TERRANCE     multivitamin w/minerals  1 tablet Oral Daily     sodium chloride (PF)  3 mL Intracatheter Q8H     thiamine  100 mg Oral Daily     acetaminophen **OR** acetaminophen, diazepam, diazepam **OR** diazepam, flumazenil, OLANZapine zydis **OR** haloperidol lactate, HYDROmorphone, ibuprofen, lidocaine 4%, lidocaine (buffered or not buffered), melatonin,  melatonin, methocarbamol, naloxone **OR** naloxone **OR** naloxone **OR** naloxone, ondansetron **OR** ondansetron, oxyCODONE, polyethylene glycol, prochlorperazine **OR** prochlorperazine **OR** prochlorperazine, senna-docusate **OR** senna-docusate, sodium chloride (PF)

## 2022-07-10 NOTE — PROGRESS NOTES
RECEIVING UNIT ED HANDOFF REVIEW    ED Nurse Handoff Report was reviewed by: Margaret Dobbs RN on July 9, 2022 at 10:19 PM

## 2022-07-11 ENCOUNTER — APPOINTMENT (OUTPATIENT)
Dept: PHYSICAL THERAPY | Facility: CLINIC | Age: 62
DRG: 897 | End: 2022-07-11
Attending: PSYCHIATRY & NEUROLOGY

## 2022-07-11 LAB
ALBUMIN SERPL-MCNC: 3.1 G/DL (ref 3.4–5)
ALP SERPL-CCNC: 41 U/L (ref 40–150)
ALT SERPL W P-5'-P-CCNC: 56 U/L (ref 0–70)
ANION GAP SERPL CALCULATED.3IONS-SCNC: 8 MMOL/L (ref 3–14)
AST SERPL W P-5'-P-CCNC: 62 U/L (ref 0–45)
BASOPHILS # BLD AUTO: 0 10E3/UL (ref 0–0.2)
BASOPHILS NFR BLD AUTO: 1 %
BILIRUB DIRECT SERPL-MCNC: 0.4 MG/DL (ref 0–0.2)
BILIRUB SERPL-MCNC: 2 MG/DL (ref 0.2–1.3)
BUN SERPL-MCNC: 8 MG/DL (ref 7–30)
CALCIUM SERPL-MCNC: 7.9 MG/DL (ref 8.5–10.1)
CHLORIDE BLD-SCNC: 100 MMOL/L (ref 94–109)
CO2 SERPL-SCNC: 18 MMOL/L (ref 20–32)
CREAT SERPL-MCNC: 0.63 MG/DL (ref 0.66–1.25)
EOSINOPHIL # BLD AUTO: 0.2 10E3/UL (ref 0–0.7)
EOSINOPHIL NFR BLD AUTO: 5 %
ERYTHROCYTE [DISTWIDTH] IN BLOOD BY AUTOMATED COUNT: 11.9 % (ref 10–15)
GFR SERPL CREATININE-BSD FRML MDRD: >90 ML/MIN/1.73M2
GLUCOSE BLD-MCNC: 115 MG/DL (ref 70–99)
HCT VFR BLD AUTO: 31 % (ref 40–53)
HGB BLD-MCNC: 10.9 G/DL (ref 13.3–17.7)
IMM GRANULOCYTES # BLD: 0 10E3/UL
IMM GRANULOCYTES NFR BLD: 1 %
LYMPHOCYTES # BLD AUTO: 0.5 10E3/UL (ref 0.8–5.3)
LYMPHOCYTES NFR BLD AUTO: 16 %
MAGNESIUM SERPL-MCNC: 1.9 MG/DL (ref 1.6–2.3)
MCH RBC QN AUTO: 34.5 PG (ref 26.5–33)
MCHC RBC AUTO-ENTMCNC: 35.2 G/DL (ref 31.5–36.5)
MCV RBC AUTO: 98 FL (ref 78–100)
MONOCYTES # BLD AUTO: 0.6 10E3/UL (ref 0–1.3)
MONOCYTES NFR BLD AUTO: 17 %
NEUTROPHILS # BLD AUTO: 2.1 10E3/UL (ref 1.6–8.3)
NEUTROPHILS NFR BLD AUTO: 60 %
NRBC # BLD AUTO: 0 10E3/UL
NRBC BLD AUTO-RTO: 0 /100
PHOSPHATE SERPL-MCNC: 3.3 MG/DL (ref 2.5–4.5)
PLATELET # BLD AUTO: 96 10E3/UL (ref 150–450)
POTASSIUM BLD-SCNC: 3.8 MMOL/L (ref 3.4–5.3)
PROT SERPL-MCNC: 6 G/DL (ref 6.8–8.8)
RBC # BLD AUTO: 3.16 10E6/UL (ref 4.4–5.9)
SODIUM SERPL-SCNC: 126 MMOL/L (ref 133–144)
WBC # BLD AUTO: 3.4 10E3/UL (ref 4–11)

## 2022-07-11 PROCEDURE — 82310 ASSAY OF CALCIUM: CPT | Performed by: STUDENT IN AN ORGANIZED HEALTH CARE EDUCATION/TRAINING PROGRAM

## 2022-07-11 PROCEDURE — 97110 THERAPEUTIC EXERCISES: CPT | Mod: GP | Performed by: PHYSICAL THERAPIST

## 2022-07-11 PROCEDURE — 36415 COLL VENOUS BLD VENIPUNCTURE: CPT | Performed by: INTERNAL MEDICINE

## 2022-07-11 PROCEDURE — 99232 SBSQ HOSP IP/OBS MODERATE 35: CPT | Performed by: INTERNAL MEDICINE

## 2022-07-11 PROCEDURE — 97116 GAIT TRAINING THERAPY: CPT | Mod: GP | Performed by: PHYSICAL THERAPIST

## 2022-07-11 PROCEDURE — 85025 COMPLETE CBC W/AUTO DIFF WBC: CPT | Performed by: INTERNAL MEDICINE

## 2022-07-11 PROCEDURE — 250N000013 HC RX MED GY IP 250 OP 250 PS 637: Performed by: STUDENT IN AN ORGANIZED HEALTH CARE EDUCATION/TRAINING PROGRAM

## 2022-07-11 PROCEDURE — 999N000157 HC STATISTIC RCP TIME EA 10 MIN

## 2022-07-11 PROCEDURE — 120N000001 HC R&B MED SURG/OB

## 2022-07-11 PROCEDURE — 83735 ASSAY OF MAGNESIUM: CPT | Performed by: STUDENT IN AN ORGANIZED HEALTH CARE EDUCATION/TRAINING PROGRAM

## 2022-07-11 PROCEDURE — 82248 BILIRUBIN DIRECT: CPT | Performed by: INTERNAL MEDICINE

## 2022-07-11 PROCEDURE — 84100 ASSAY OF PHOSPHORUS: CPT | Performed by: STUDENT IN AN ORGANIZED HEALTH CARE EDUCATION/TRAINING PROGRAM

## 2022-07-11 PROCEDURE — 94150 VITAL CAPACITY TEST: CPT

## 2022-07-11 PROCEDURE — 258N000003 HC RX IP 258 OP 636: Performed by: STUDENT IN AN ORGANIZED HEALTH CARE EDUCATION/TRAINING PROGRAM

## 2022-07-11 RX ADMIN — ACETAMINOPHEN 975 MG: 325 TABLET ORAL at 00:26

## 2022-07-11 RX ADMIN — IBUPROFEN 600 MG: 600 TABLET ORAL at 10:50

## 2022-07-11 RX ADMIN — IBUPROFEN 600 MG: 600 TABLET ORAL at 16:13

## 2022-07-11 RX ADMIN — MULTIPLE VITAMINS W/ MINERALS TAB 1 TABLET: TAB at 08:11

## 2022-07-11 RX ADMIN — ACETAMINOPHEN 975 MG: 325 TABLET ORAL at 08:11

## 2022-07-11 RX ADMIN — LEVETIRACETAM 500 MG: 500 TABLET, FILM COATED ORAL at 08:11

## 2022-07-11 RX ADMIN — LEVETIRACETAM 500 MG: 500 TABLET, FILM COATED ORAL at 19:55

## 2022-07-11 RX ADMIN — SODIUM CHLORIDE: 9 INJECTION, SOLUTION INTRAVENOUS at 04:52

## 2022-07-11 RX ADMIN — IBUPROFEN 600 MG: 600 TABLET ORAL at 22:07

## 2022-07-11 RX ADMIN — OXYCODONE HYDROCHLORIDE 5 MG: 5 TABLET ORAL at 22:49

## 2022-07-11 RX ADMIN — THIAMINE HCL TAB 100 MG 100 MG: 100 TAB at 08:11

## 2022-07-11 RX ADMIN — FOLIC ACID 1 MG: 1 TABLET ORAL at 08:11

## 2022-07-11 RX ADMIN — GABAPENTIN 900 MG: 300 CAPSULE ORAL at 06:47

## 2022-07-11 RX ADMIN — LIDOCAINE 1 PATCH: 560 PATCH PERCUTANEOUS; TOPICAL; TRANSDERMAL at 00:26

## 2022-07-11 RX ADMIN — LIDOCAINE 1 PATCH: 560 PATCH PERCUTANEOUS; TOPICAL; TRANSDERMAL at 22:07

## 2022-07-11 RX ADMIN — CLONIDINE HYDROCHLORIDE 0.1 MG: 0.1 TABLET ORAL at 06:47

## 2022-07-11 RX ADMIN — IBUPROFEN 600 MG: 600 TABLET ORAL at 04:47

## 2022-07-11 RX ADMIN — ACETAMINOPHEN 975 MG: 325 TABLET ORAL at 16:13

## 2022-07-11 ASSESSMENT — ACTIVITIES OF DAILY LIVING (ADL)
ADLS_ACUITY_SCORE: 20
ADLS_ACUITY_SCORE: 22
ADLS_ACUITY_SCORE: 20
ADLS_ACUITY_SCORE: 20
ADLS_ACUITY_SCORE: 22
ADLS_ACUITY_SCORE: 22
ADLS_ACUITY_SCORE: 20
ADLS_ACUITY_SCORE: 20
ADLS_ACUITY_SCORE: 22

## 2022-07-11 NOTE — PROGRESS NOTES
Patient achieved  NIF of -50 cm H 2 O and VC of 4500 ml. Both done with patient good efforts.  7/11/2022  Nori Julio, RT     clear

## 2022-07-11 NOTE — PROGRESS NOTES
"[facility]    Internal Medicine Hospitalist Progress Note  07/11/2022  I evaluated patient on the above date.    Jesus Solo Jr., MD  [phone] (p)  Text Page  Vocera        Assessment & Plan New actions/orders today (07/11/2022) are underlined.    Jose Stovall is a 61 year old male with history including alcohol use d/o with h/o withdrawal seizure, who presented 7/9/2022 with seizure and found with sodium 123 and with left sided rib fracture's,     Seizure, suspect 2/2 EtOH withdrawal with hyponatremia.  Hx of EtOH withdrawal seizures.  EtOH use disorder.  * Prior h/o seizures 7-8 years ago; however, per pt this seemed to be associated with stopping drinking; was previously on meds; no seizures for 7 years.  * [place] [facility] [place] (was going back home to [place], [place], after visiting family in [place]). Typically drinks 2-3 tall boys daily; did not drink on the day of admit.  * Started on CIWA on admit. Started on 7d course of levetiracetam on admit.  * 7/11: Improved. Neurology consulted. Brain MRI 7/11 showed no acute findings though there was moderate diffuse cerebral parenchymal volume loss, advanced for age.  - Continue CIWA with PRN diazepam.  - Discontinue gabapentin.  - Discontinue scheduled clonidine.  - Continue vitamins.  - Continue levetiracetam.  - Treat hyponatremia as noted.  - Appreciate Neurology help.    Hyponatremia, possibly related to chronic EtOH (\"beer potomania\"), question SIADH component.  * Sodium 123 on admit. Started on NS on admit.  * 7/11: Sodium level improved. Labs showed urine sodium 24, urine osmol 260, serum osmol 265.  Recent Labs   Lab 07/11/22  0911 07/10/22  1124 07/09/22  1637   * 129* 123*   - Discontinue IVF's.  - Start 2000 ml fluid restriction.  - Monitor BMP.    Elevated LFTs, suspect alcoholic hepatitis.  Hepatic steatosis.  * LFT's on admit 7/9 showed TBili 1.9, ALP 60, , .  * US 7/10 showed no acute " findings; fatty liver.  Recent Labs   Lab 07/11/22  0911 07/10/22  1124 07/09/22  1637   ALBUMIN 3.1* 3.5 4.4   BILITOTAL 2.0* 2.7* 1.9*   ALT 56 77* 105*   AST 62* 72* 125*   ALKPHOS 41 44 60   - Monitor LFT's.    Acute left 8th and 9th rib fracture, suspect traumatic related to seizure.  * Per [place], [place]. Pt c/o left chest pain in the ED. Rib/chest x-ray 7/9 showed small amount of streaky opacity in the left lung base consistent with atelectasis or infiltrate; minimally displaced lateral left eighth and ninth rib fractures; small amount of left pleural fluid; no pneumothorax.  * Started on scheduled acetaminophen, scheduled ibuprofen, lidocaine patch on admit.  - Continue acetaminophen 975 mg q8h; ibuprofen 600 mg q6h; lidocaine patch 4% q24h; PRN acetaminophen; PRN methocarbamol 500 mg q6h; PRN oxycodone 5 mg q4h, PRN IV hydromorphone 0.2 mg q2h; minimize opioids as able.  - Continue IS, pulmonary toilet.  - PT, OT consulted.    HTN, possibly related to alcohol withdrawal.  * Started on clonidine per EtOH w/d protocol.  - Discontinue clonidine 0.1 mg q8h.  - Continue to treat EtOH w/d as noted.    Thrombocytopenia, mild, suspect related to liver disease.  * Plts 120K on admit 7/9.  Recent Labs   Lab 07/11/22  0911 07/10/22  1124 07/09/22  1637   PLT 96* 111* 120*   - Monitor CBC.    Anemia, suspect chronic component.  * Hgb 12.8 on admit. No overt clinical signs of major bleeding.  Recent Labs   Lab 07/11/22  0911 07/10/22  1124 07/09/22  1637   HGB 10.9* 11.4* 12.8*   - Monitor CBC.    Leukopenia, possibly medication effect.  * WBC normal on admit.  * WBC dropped to 3.4 on 7/11.  Recent Labs   Lab 07/11/22  0911 07/10/22  1124 07/09/22  1637   WBC 3.4* 4.3 4.3   - Monitor CBC.    Clinically Significant Risk Factors Present on Admission                        COVID-19 testing.  COVID-19 PCR Results    COVID-19 PCR Results 7/9/22   SARS CoV2 PCR Negative      Comments are  "available for some flowsheets but are not being displayed.         COVID-19 Antibody Results, Testing for Immunity    COVID-19 Antibody Results, Testing for Immunity   No data to display.             Diet: Combination Diet Regular Diet Adult  Fluid restriction 1800 ML FLUID    Prophylaxis: PCD's, ambulation.   Colón Catheter: Not present  Central Lines: None  Code Status: Full Code    Disposition Plan   Expected discharge: possibly tomorrow recommended to prior living arrangement if sodium improved and pending Neuro rec's about AED therapy.  Entered: Jesus Solo MD 07/11/2022, 10:36 AM           Interval History   Doing better today.  Rib pain improved today.      -Data reviewed today: I reviewed all new labs and imaging over the last 24 hours. I personally reviewed no images or EKG's today.    Physical Exam    , Blood pressure 115/65, pulse 57, temperature 98.8  F (37.1  C), temperature source Oral, resp. rate 19, height 1.803 m (5' 11\"), weight 61.2 kg (135 lb), SpO2 97 %. O2 Device: None (Room air)    Vitals:    07/09/22 1620   Weight: 61.2 kg (135 lb)     Vital Signs with Ranges  Temp:  [97.5  F (36.4  C)-98.8  F (37.1  C)] 98.8  F (37.1  C)  Pulse:  [54-60] 57  Resp:  [14-19] 19  BP: ()/(61-74) 115/65  SpO2:  [97 %-98 %] 97 %  Patient Vitals for the past 24 hrs:   BP Temp Temp src Pulse Resp SpO2   07/11/22 0700 115/65 98.8  F (37.1  C) Oral 57 19 97 %   07/11/22 0646 116/74 -- -- 59 -- --   07/11/22 0445 108/73 -- -- 60 -- 98 %   07/10/22 2246 110/68 -- -- 55 16 97 %   07/10/22 2215 -- -- -- -- 16 --   07/10/22 2135 102/64 -- -- 58 16 --   07/10/22 2030 91/61 98.6  F (37  C) Oral 60 16 98 %   07/10/22 1544 111/70 97.5  F (36.4  C) Oral 54 14 98 %     I/O's Last 24 hours  I/O last 3 completed shifts:  In: 2128 [I.V.:2128]  Out: 2175 [Urine:2175]    Constitutional: Awake, alert, pleasant, conversant.  Respiratory: Diminished in bases. No crackles or wheezes.  Cardiovascular: RRR, no m/r/g.  GI: " Soft, nt, nd, +BS.  Skin/Integumen:   Other:        Data   Recent Labs   Lab 07/11/22  0911 07/10/22  1124 07/09/22  2247 07/09/22  1637   WBC 3.4* 4.3  --  4.3   HGB 10.9* 11.4*  --  12.8*   MCV 98 97  --  95   PLT 96* 111*  --  120*   * 129*  --  123*   POTASSIUM 3.8 3.5 3.6 3.4   CHLORIDE 100 98  --  91*   CO2 18* 22  --  16*   BUN 8 9  --  7   CR 0.63* 0.59*  --  0.54*   ANIONGAP 8 9  --  16*   JEAN-CLAUDE 7.9* 8.6  --  8.8   * 110*  --  144*   ALBUMIN 3.1* 3.5  --  4.4   PROTTOTAL 6.0* 6.9  --  7.8   BILITOTAL 2.0* 2.7*  --  1.9*   ALKPHOS 41 44  --  60   ALT 56 77*  --  105*   AST 62* 72*  --  125*     Recent Labs   Lab Test 07/11/22  0911 07/10/22  1124 07/09/22  1637   * 110* 144*     Recent Labs   Lab 07/11/22  0911 07/10/22  1124 07/09/22  1637   WBC 3.4* 4.3 4.3         Recent Results (from the past 24 hour(s))   MR Brain w/o & w Contrast    Narrative    EXAM: MR BRAIN W/O and W CONTRAST  LOCATION: [facility]  DATE/TIME: 7/10/2022 8:57 PM    INDICATION: Generalized seizure  COMPARISON: None.  CONTRAST: 6mL gadavist  TECHNIQUE: 1.5 Lindsay multiplanar multisequence head MRI without and with intravenous contrast according to the seizure protocol.    FINDINGS:  INTRACRANIAL CONTENTS: Dedicated imaging of the temporal lobes demonstrates symmetrical size and signal intensity of the mesial temporal structures including the hippocampus. No malformations of cortical development. No acute or subacute infarct. No   mass, acute hemorrhage, or extra-axial fluid collections. Scattered nonspecific T2/FLAIR hyperintensities within the cerebral white matter most consistent with mild chronic microvascular ischemic change. Moderate generalized cerebral atrophy. No   hydrocephalus.  Normal position of the cerebellar tonsils. No pathologic contrast enhancement.    SELLA: No abnormality accounting for technique.    OSSEOUS STRUCTURES/SOFT TISSUES: Normal marrow signal. The major  intracranial vascular flow voids are maintained.     ORBITS: No abnormality accounting for technique.     SINUSES/MASTOIDS: No paranasal sinus mucosal disease. No middle ear or mastoid effusion.       Impression    IMPRESSION:  1.  No epileptogenic focus identified.  2.  No acute intracranial process.  3.  Mild presumed chronic small vessel ischemic changes.  4.  Moderate diffuse cerebral parenchymal volume loss, advanced for age.       Medications   All medications were reviewed.      acetaminophen  975 mg Oral Q8H     cloNIDine  0.1 mg Oral Q8H     folic acid  1 mg Oral Daily     [START ON 7/17/2022] gabapentin  100 mg Oral Q8H     [START ON 7/15/2022] gabapentin  300 mg Oral Q8H     [START ON 7/13/2022] gabapentin  600 mg Oral Q8H     gabapentin  900 mg Oral Q8H     ibuprofen  600 mg Oral Q6H     levETIRAcetam  500 mg Oral BID     lidocaine  1 patch Transdermal Q24H     lidocaine   Transdermal Q8H TERRANCE     multivitamin w/minerals  1 tablet Oral Daily     sodium chloride (PF)  3 mL Intracatheter Q8H     thiamine  100 mg Oral Daily     acetaminophen **OR** acetaminophen, diazepam, diazepam **OR** diazepam, flumazenil, OLANZapine zydis **OR** haloperidol lactate, HYDROmorphone, ibuprofen, lidocaine 4%, lidocaine (buffered or not buffered), melatonin, methocarbamol, naloxone **OR** naloxone **OR** naloxone **OR** naloxone, ondansetron **OR** ondansetron, oxyCODONE, polyethylene glycol, prochlorperazine **OR** prochlorperazine **OR** prochlorperazine, senna-docusate **OR** senna-docusate, sodium chloride (PF)

## 2022-07-11 NOTE — PLAN OF CARE
Summary: Witnessed seizure at the airport. Hyponatremia   DATE & TIME: 7/10/2022 [id number]  Cognitive Concerns/ Orientation : A&Ox4; forgetful.   BEHAVIOR & AGGRESSION TOOL COLOR: Green  CIWA SCORE: 0  ABNL VS/O2: VSS on RA  MOBILITY: Assist of 1 with GB, walker  PAIN MANAGMENT: Scheduled Tylenol, Advil, Lidoderm patch (patch free period). PRN oxycodone given x1   DIET: Regular  BOWEL/BLADDER: Continent. Constipated, PRN miralax given   ABNL LAB/BG: ALT 77 AST 72  (US of abdomen negative aside from steatosis). CXR showed Left 8th and 9th rib fractures, no pneumothorax   DRAIN/DEVICES: PIV infusing NS at 100 mL/hr   TELEMETRY RHYTHM: NA  SKIN: Bruises, scabs, redness to BLE. L hand contusion.   TESTS/PROCEDURES: MRI of brain and EEG ordered this shift. OT consult pending.   D/C DAY/GOALS/PLACE:  pending.  OTHER IMPORTANT INFO: Seizure precautions in place

## 2022-07-11 NOTE — PLAN OF CARE
Goal Outcome Evaluation:        Summary: Witnessed seizure at the airport. Hyponatremia   DATE & TIME: 7/10/2022 1900- 7/11/22 0730  Cognitive Concerns/ Orientation : A&Ox4; forgetful.   BEHAVIOR & AGGRESSION TOOL COLOR: Green  CIWA SCORE: 1  ABNL VS/O2: VSS except soft BP at times on RA  MOBILITY: Assist of 1 with GB, walker  PAIN MANAGMENT: Scheduled Tylenol, Advil, Lidoderm patch, PRN oxycodone given x1 for L side rib/ back pain  DIET: Regular  BOWEL/BLADDER: Continent. Constipated, PRN miralax given   ABNL LAB/BG: None new  DRAIN/DEVICES: PIV infusing NS at 100 mL/hr   TELEMETRY RHYTHM: NA  SKIN: Bruises, scabs, redness to BLE. L hand contusion.   TESTS/PROCEDURES: MRI of brain complete: no acute infarcts and no mass or hemorrhage noted.   EEG ordered. OT consult pending.   D/C DAY/GOALS/PLACE:  pending.  OTHER IMPORTANT INFO: Seizure precautions in place.  Neurology following.

## 2022-07-11 NOTE — CONSULTS
[facility]  Neurology Daily Note      Admission Date:7/9/2022   Date of service: 07/11/2022   Hospital Day: 3                                                   Assessment and Plan:   #.  Seizure most consistent with alcohol withdrawal seizure  -- Continue levetiracetam 500 mg twice daily  Continue to gradually correct hyponatremia.  Alcohol withdrawal seizure protocol per hospitalist service    EEG is pending we will review when available          Interval History:   Patient reports that he feels back to baseline.  He does notice some swelling of his tongue where he bit it with the seizure.  He is interested in a discharge but has been told that they need to watch his sodium.  He was up with physical therapy earlier today and reports that his balance seems fine       Review of Systems:   Noncontributory       Medications:   Scheduled Meds:    acetaminophen  975 mg Oral Q8H     folic acid  1 mg Oral Daily     ibuprofen  600 mg Oral Q6H     levETIRAcetam  500 mg Oral BID     lidocaine  1 patch Transdermal Q24H     lidocaine   Transdermal Q8H TERRANCE     multivitamin w/minerals  1 tablet Oral Daily     sodium chloride (PF)  3 mL Intracatheter Q8H     thiamine  100 mg Oral Daily     PRN Meds: acetaminophen **OR** acetaminophen, diazepam, diazepam **OR** diazepam, flumazenil, OLANZapine zydis **OR** haloperidol lactate, HYDROmorphone, ibuprofen, lidocaine 4%, lidocaine (buffered or not buffered), melatonin, methocarbamol, naloxone **OR** naloxone **OR** naloxone **OR** naloxone, ondansetron **OR** ondansetron, oxyCODONE, polyethylene glycol, prochlorperazine **OR** prochlorperazine **OR** prochlorperazine, senna-docusate **OR** senna-docusate, sodium chloride (PF)        Physical Exam:   Vitals: Temp: 97.7  F (36.5  C) Temp src: Oral BP: 121/77 Pulse: 61   Resp: 18 SpO2: 98 % O2 Device: None (Room air)    Vital Signs with Ranges: Temp:  [97.5  F (36.4  C)-98.8  F (37.1  C)] 97.7  F (36.5  C)  Pulse:  [54-61]  61  Resp:  [14-19] 18  BP: ()/(61-77) 121/77  SpO2:  [97 %-98 %] 98 %    General Appearance:  No acute distress  Neuro:       Mental Status Exam:   Alert and oriented.  Language and speech is intact       Cranial Nerves: Extraocular movements intact.  No nystagmus           Motor: Coordination intact x4            Data:   ROUTINE IP LABS (Last 3results)  CBC RESULTS:     Recent Labs   Lab Test 07/11/22  0911 07/10/22  1124 07/09/22  1637   WBC 3.4* 4.3 4.3   RBC 3.16* 3.32* 3.78*   HGB 10.9* 11.4* 12.8*   HCT 31.0* 32.3* 36.0*   PLT 96* 111* 120*     Basic Metabolic Panel:  Recent Labs   Lab Test 07/11/22  0911 07/10/22  1124 07/09/22  2247 07/09/22  1637   * 129*  --  123*   POTASSIUM 3.8 3.5 3.6 3.4   CHLORIDE 100 98  --  91*   CO2 18* 22  --  16*   BUN 8 9  --  7   CR 0.63* 0.59*  --  0.54*   * 110*  --  144*   JEAN-CLAUDE 7.9* 8.6  --  8.8     Liver panel:  Recent Labs   Lab Test 07/11/22  0911 07/10/22  1124 07/09/22  1637   PROTTOTAL 6.0* 6.9 7.8   ALBUMIN 3.1* 3.5 4.4   BILITOTAL 2.0* 2.7* 1.9*   ALKPHOS 41 44 60   AST 62* 72* 125*   ALT 56 77* 105*        IMAGING:   All imaging studies were reviewed personally    MRI brain:   EXAM: MR BRAIN W/O and W CONTRAST  LOCATION: [facility]  DATE/TIME: 7/10/2022 8:57 PM     INDICATION: Generalized seizure  COMPARISON: None.  CONTRAST: 6mL gadavist  TECHNIQUE: 1.5 Lindsay multiplanar multisequence head MRI without and with intravenous contrast according to the seizure protocol.     FINDINGS:  INTRACRANIAL CONTENTS: Dedicated imaging of the temporal lobes demonstrates symmetrical size and signal intensity of the mesial temporal structures including the hippocampus. No malformations of cortical development. No acute or subacute infarct. No   mass, acute hemorrhage, or extra-axial fluid collections. Scattered nonspecific T2/FLAIR hyperintensities within the cerebral white matter most consistent with mild chronic microvascular ischemic  change. Moderate generalized cerebral atrophy. No   hydrocephalus.  Normal position of the cerebellar tonsils. No pathologic contrast enhancement.     SELLA: No abnormality accounting for technique.     OSSEOUS STRUCTURES/SOFT TISSUES: Normal marrow signal. The major intracranial vascular flow voids are maintained.      ORBITS: No abnormality accounting for technique.      SINUSES/MASTOIDS: No paranasal sinus mucosal disease. No middle ear or mastoid effusion.                                                                       IMPRESSION:  1.  No epileptogenic focus identified.  2.  No acute intracranial process.  3.  Mild presumed chronic small vessel ischemic changes.  4.  Moderate diffuse cerebral parenchymal volume loss, advanced for age          TIME     25minutes Evaluation/management time     Nga Rahman M.D.  Neurologist  [facility] Neurology  Office [phone]

## 2022-07-12 ENCOUNTER — HOSPITAL ENCOUNTER (INPATIENT)
Dept: NEUROLOGY | Facility: CLINIC | Age: 62
Discharge: HOME | DRG: 897 | End: 2022-07-12
Attending: PSYCHIATRY & NEUROLOGY

## 2022-07-12 LAB
ANION GAP SERPL CALCULATED.3IONS-SCNC: 10 MMOL/L (ref 3–14)
BUN SERPL-MCNC: 7 MG/DL (ref 7–30)
CALCIUM SERPL-MCNC: 8.4 MG/DL (ref 8.5–10.1)
CHLORIDE BLD-SCNC: 98 MMOL/L (ref 94–109)
CO2 SERPL-SCNC: 21 MMOL/L (ref 20–32)
CREAT SERPL-MCNC: 0.53 MG/DL (ref 0.66–1.25)
GFR SERPL CREATININE-BSD FRML MDRD: >90 ML/MIN/1.73M2
GLUCOSE BLD-MCNC: 102 MG/DL (ref 70–99)
MAGNESIUM SERPL-MCNC: 1.8 MG/DL (ref 1.6–2.3)
PHOSPHATE SERPL-MCNC: 3.4 MG/DL (ref 2.5–4.5)
POTASSIUM BLD-SCNC: 3.8 MMOL/L (ref 3.4–5.3)
SODIUM SERPL-SCNC: 129 MMOL/L (ref 133–144)

## 2022-07-12 PROCEDURE — 250N000013 HC RX MED GY IP 250 OP 250 PS 637: Performed by: STUDENT IN AN ORGANIZED HEALTH CARE EDUCATION/TRAINING PROGRAM

## 2022-07-12 PROCEDURE — 84100 ASSAY OF PHOSPHORUS: CPT | Performed by: STUDENT IN AN ORGANIZED HEALTH CARE EDUCATION/TRAINING PROGRAM

## 2022-07-12 PROCEDURE — 99233 SBSQ HOSP IP/OBS HIGH 50: CPT | Performed by: HOSPITALIST

## 2022-07-12 PROCEDURE — 95816 EEG AWAKE AND DROWSY: CPT

## 2022-07-12 PROCEDURE — 83735 ASSAY OF MAGNESIUM: CPT | Performed by: STUDENT IN AN ORGANIZED HEALTH CARE EDUCATION/TRAINING PROGRAM

## 2022-07-12 PROCEDURE — 36415 COLL VENOUS BLD VENIPUNCTURE: CPT | Performed by: INTERNAL MEDICINE

## 2022-07-12 PROCEDURE — 120N000001 HC R&B MED SURG/OB

## 2022-07-12 PROCEDURE — 80048 BASIC METABOLIC PNL TOTAL CA: CPT | Performed by: INTERNAL MEDICINE

## 2022-07-12 RX ADMIN — ACETAMINOPHEN 975 MG: 325 TABLET ORAL at 17:19

## 2022-07-12 RX ADMIN — SENNOSIDES AND DOCUSATE SODIUM 1 TABLET: 50; 8.6 TABLET ORAL at 23:58

## 2022-07-12 RX ADMIN — IBUPROFEN 600 MG: 600 TABLET ORAL at 17:19

## 2022-07-12 RX ADMIN — FOLIC ACID 1 MG: 1 TABLET ORAL at 08:47

## 2022-07-12 RX ADMIN — MULTIPLE VITAMINS W/ MINERALS TAB 1 TABLET: TAB at 08:47

## 2022-07-12 RX ADMIN — IBUPROFEN 600 MG: 600 TABLET ORAL at 04:59

## 2022-07-12 RX ADMIN — LEVETIRACETAM 500 MG: 500 TABLET, FILM COATED ORAL at 20:41

## 2022-07-12 RX ADMIN — ACETAMINOPHEN 975 MG: 325 TABLET ORAL at 08:46

## 2022-07-12 RX ADMIN — IBUPROFEN 600 MG: 600 TABLET ORAL at 23:58

## 2022-07-12 RX ADMIN — THIAMINE HCL TAB 100 MG 100 MG: 100 TAB at 08:47

## 2022-07-12 RX ADMIN — IBUPROFEN 600 MG: 600 TABLET ORAL at 13:04

## 2022-07-12 RX ADMIN — ACETAMINOPHEN 975 MG: 325 TABLET ORAL at 23:57

## 2022-07-12 RX ADMIN — POLYETHYLENE GLYCOL 3350 17 G: 17 POWDER, FOR SOLUTION ORAL at 08:54

## 2022-07-12 RX ADMIN — LEVETIRACETAM 500 MG: 500 TABLET, FILM COATED ORAL at 08:47

## 2022-07-12 RX ADMIN — ACETAMINOPHEN 975 MG: 325 TABLET ORAL at 01:03

## 2022-07-12 ASSESSMENT — ACTIVITIES OF DAILY LIVING (ADL)
ADLS_ACUITY_SCORE: 23
ADLS_ACUITY_SCORE: 22
DEPENDENT_IADLS:: INDEPENDENT
ADLS_ACUITY_SCORE: 22
ADLS_ACUITY_SCORE: 23
ADLS_ACUITY_SCORE: 23
ADLS_ACUITY_SCORE: 22
ADLS_ACUITY_SCORE: 22
ADLS_ACUITY_SCORE: 21
ADLS_ACUITY_SCORE: 23
ADLS_ACUITY_SCORE: 22
ADLS_ACUITY_SCORE: 23
ADLS_ACUITY_SCORE: 23

## 2022-07-12 NOTE — CONSULTS
[facility]  Neurology Daily Note      Admission Date:7/9/2022   Date of service: 07/12/2022   Hospital Day: 4                                                   Assessment and Plan:   #.  Seizure most consistent with alcohol withdrawal seizure  -- Continue levetiracetam 500 mg twice daily  Continue to gradually correct hyponatremia.  Alcohol withdrawal seizure protocol per hospitalist service    EEG-reveals primarily the effects of medication (benzodiazepines) no active seizure discharge  Patient is neurologically stable for discharge    At discharge would continue levetiracetam 500 mg twice daily.  He should follow-up with primary care on return to [place] for evaluation and treatment of alcohol issues as well as consideration of long-term anticonvulsant therapy.  Plan discussed with patient.    Will sign off.  Please contact General Neurology service if questions related to neurologic status or follow up needed during this admission.             Interval History:   No further seizures.  Patient feels well and is interested in discharge  He reports all of his seizures in the past which she has had for many years have been related to alcohol withdrawal in combination with stress.           Review of Systems:   Noncontributory       Medications:   Scheduled Meds:    acetaminophen  975 mg Oral Q8H     folic acid  1 mg Oral Daily     ibuprofen  600 mg Oral Q6H     levETIRAcetam  500 mg Oral BID     lidocaine  1 patch Transdermal Q24H     lidocaine   Transdermal Q8H TERRANCE     multivitamin w/minerals  1 tablet Oral Daily     sodium chloride (PF)  3 mL Intracatheter Q8H     thiamine  100 mg Oral Daily     PRN Meds: acetaminophen **OR** acetaminophen, diazepam, diazepam **OR** diazepam, flumazenil, OLANZapine zydis **OR** haloperidol lactate, HYDROmorphone, ibuprofen, lidocaine 4%, lidocaine (buffered or not buffered), melatonin, methocarbamol, naloxone **OR** naloxone **OR** naloxone **OR** naloxone, ondansetron **OR**  ondansetron, oxyCODONE, polyethylene glycol, prochlorperazine **OR** prochlorperazine **OR** prochlorperazine, senna-docusate **OR** senna-docusate, sodium chloride (PF)        Physical Exam:   Vitals: Temp: 98.4  F (36.9  C) Temp src: Oral BP: (!) 146/84 Pulse: 64   Resp: 18 SpO2: 96 % O2 Device: None (Room air)    Vital Signs with Ranges: Temp:  [98.4  F (36.9  C)-99.2  F (37.3  C)] 98.4  F (36.9  C)  Pulse:  [58-65] 64  Resp:  [18] 18  BP: (119-146)/(75-84) 146/84  SpO2:  [96 %-99 %] 96 %    General Appearance:  No acute distress  Neuro:       Mental Status Exam:   Alert and oriented.  Language and speech is intact       Cranial Nerves: Extraocular movements intact.  No nystagmus           Motor: Coordination intact x4            Data:   ROUTINE IP LABS (Last 3results)  CBC RESULTS:     Recent Labs   Lab Test 07/11/22  0911 07/10/22  1124 07/09/22  1637   WBC 3.4* 4.3 4.3   RBC 3.16* 3.32* 3.78*   HGB 10.9* 11.4* 12.8*   HCT 31.0* 32.3* 36.0*   PLT 96* 111* 120*     Basic Metabolic Panel:  Recent Labs   Lab Test 07/12/22  0955 07/11/22  0911 07/10/22  1124   * 126* 129*   POTASSIUM 3.8 3.8 3.5   CHLORIDE 98 100 98   CO2 21 18* 22   BUN 7 8 9   CR 0.53* 0.63* 0.59*   * 115* 110*   JEAN-CLAUDE 8.4* 7.9* 8.6     Liver panel:  Recent Labs   Lab Test 07/11/22  0911 07/10/22  1124 07/09/22  1637   PROTTOTAL 6.0* 6.9 7.8   ALBUMIN 3.1* 3.5 4.4   BILITOTAL 2.0* 2.7* 1.9*   ALKPHOS 41 44 60   AST 62* 72* 125*   ALT 56 77* 105*        IMAGING:   All imaging studies were reviewed personally    MRI brain:   EXAM: MR BRAIN W/O and W CONTRAST  LOCATION: [facility]  DATE/TIME: 7/10/2022 8:57 PM     INDICATION: Generalized seizure  COMPARISON: None.  CONTRAST: 6mL gadavist  TECHNIQUE: 1.5 Lindsay multiplanar multisequence head MRI without and with intravenous contrast according to the seizure protocol.     FINDINGS:  INTRACRANIAL CONTENTS: Dedicated imaging of the temporal lobes demonstrates  symmetrical size and signal intensity of the mesial temporal structures including the hippocampus. No malformations of cortical development. No acute or subacute infarct. No   mass, acute hemorrhage, or extra-axial fluid collections. Scattered nonspecific T2/FLAIR hyperintensities within the cerebral white matter most consistent with mild chronic microvascular ischemic change. Moderate generalized cerebral atrophy. No   hydrocephalus.  Normal position of the cerebellar tonsils. No pathologic contrast enhancement.     SELLA: No abnormality accounting for technique.     OSSEOUS STRUCTURES/SOFT TISSUES: Normal marrow signal. The major intracranial vascular flow voids are maintained.      ORBITS: No abnormality accounting for technique.      SINUSES/MASTOIDS: No paranasal sinus mucosal disease. No middle ear or mastoid effusion.                                                                       IMPRESSION:  1.  No epileptogenic focus identified.  2.  No acute intracranial process.  3.  Mild presumed chronic small vessel ischemic changes.  4.  Moderate diffuse cerebral parenchymal volume loss, advanced for age          TIME     25minutes Evaluation/management time     Nga Rahman M.D.  Neurologist  [facility] Neurology  Office [phone]

## 2022-07-12 NOTE — PROGRESS NOTES
Patient perform  NIF-60 mmHg and VC 4600 ml with good effort. RT to continue to follow per doctor order.

## 2022-07-12 NOTE — PLAN OF CARE
Goal Outcome Evaluation:      Summary: Witnessed seizure, Hyponatremia   DATE & TIME: 7/11/22-7/12/22 [id number]  Cognitive Concerns/ Orientation : A&Ox4  BEHAVIOR & AGGRESSION TOOL COLOR: Green  CIWA SCORE: 1  ABNL VS/O2: VSS on RA  MOBILITY: Assist of 1 with GB, walker  PAIN MANAGMENT: Scheduled Tylenol, Advil, Lidoderm patch   DIET: Regular - (2000ml Fluid restriction)   BOWEL/BLADDER: Continent. Constipated, PRN miralax given 7/11 on PM shift, no result yet.  ABNL LAB/BG: None new  DRAIN/DEVICES: PIV  RFA placed FL d/c'd.    TELEMETRY RHYTHM: NA  SKIN: Bruises, scabs, redness to BLE. L hand contusion.   TESTS/PROCEDURES: MRI of brain completed (7/10): no acute infarcts and no mass or hemorrhage noted).  EEG ordered - to be completed possibly 7/12 -   D/C DAY/GOALS/PLACE:  pending.  OTHER IMPORTANT INFO: Seizure precautions in place.  Neurology following.

## 2022-07-12 NOTE — PLAN OF CARE
Goal Outcome Evaluation:    Summary[place] Hyp[place]   DATE & TIME: 7/11/2022 1107-7421  Cognitive Concerns/ Orientation : A&Ox4; forgetful.   BEHAVIOR & AGGRESSION TOOL COLOR: Green  CIWA SCORE: 1  ABNL VS/O2: VSS on RA  MOBILITY: Assist of 1 with GB, walker  PAIN MANAGMENT: Scheduled Tylenol, Advil, Lidoderm patch,  - PRN oxycodone given for rib pain.   DIET: Regular - good appetite (FL restriction 2000ml)   BOWEL/BLADDER: Continent. Constipated, PRN miralax given   ABNL LAB/BG: None new  DRAIN/DEVICES: PIV  RFA placed FL d/c'd.    TELEMETRY RHYTHM: NA  SKIN: Bruises, scabs, redness to BLE. L hand contusion.   TESTS/PROCEDURES: MRI of brain completed (7/10): no acute infarcts and no mass or hemorrhage noted).   EEG ordered - to be completed possibly 7/12 - OT consult pending.   D/C DAY/GOALS/PLACE:  pending.  OTHER IMPORTANT INFO: Seizure precautions in place.  Neurology following.

## 2022-07-12 NOTE — CONSULTS
Care Management Initial Consult    General Information  Assessment completed with: Patient,    Type of CM/SW Visit: Initial Assessment    Primary Care Provider verified and updated as needed: Yes   Readmission within the last 30 days:        Reason for Consult: discharge planning  Advance Care Planning:            Communication Assessment  Patient's communication style: spoken language (English or Bilingual)    Hearing Difficulty or Deaf: no   Wear Glasses or Blind: no    Cognitive  Cognitive/Neuro/Behavioral: WDL  Level of Consciousness: alert  Arousal Level: opens eyes spontaneously  Orientation: oriented x 4  Mood/Behavior: calm, cooperative  Best Language: 0 - No aphasia  Speech: clear, spontaneous    Living Environment:   People in home: alone     Current living Arrangements: apartment      Able to return to prior arrangements: yes       Family/Social Support:  Care provided by: self  Provides care for: no one, unable/limited ability to care for self      (None)          Description of Support System: Uninvolved    Support Assessment: Limited social contact and support    Current Resources:   Patient receiving home care services: No     Community Resources:    Equipment currently used at home: none  Supplies currently used at home:      Employment/Financial:  Employment Status: disabled        Financial Concerns:             Lifestyle & Psychosocial Needs:  Social Determinants of Health     Tobacco Use: High Risk     Smoking Tobacco Use: Current Every Day Smoker     Smokeless Tobacco Use: Never Used   Alcohol Use: Not on file   Financial Resource Strain: Not on file   Food Insecurity: Not on file   Transportation Needs: Not on file   Physical Activity: Not on file   Stress: Not on file   Social Connections: Not on file   Intimate Partner Violence: Not on file   Depression: Not on file   Housing Stability: Not on file       Functional Status:  Prior to admission patient needed assistance:   Dependent ADLs::  Independent  Dependent IADLs:: Independent       Mental Health Status:  Mental Health Status: Past Concern       Chemical Dependency Status:  Chemical Dependency Status: Current Concern  Chemical Dependency Management: Previous treatment          Values/Beliefs:  Spiritual, Cultural Beliefs, [employer]ism Practices, Values that affect care: no               Additional Information:  Met with patient to discuss discharge plans. Patient stating he is from  [place] visiting ex spouse and family. Patient stays in a motel and his 2 sons live close to him. Patient sons help with groceries.  Discussed therapy recommendation for home care visits and patient has declined stating it would be difficult to get a home care agency. Patient is not home bound.  Discussed ETOH rx program and patient feels he might be able to quit on his own but not guaranteed. 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   Patient stating he is feeling better and intends to be dcd tomorrow 7/13.  Patient will be transported by his sister-in-law who has also made arrangements by flight to return to [place].       Fabian Powers RN CC

## 2022-07-12 NOTE — PROGRESS NOTES
"[facility]    Internal Medicine Hospitalist Progress Note  07/12/2022  I evaluated patient on the above date.        Assessment & Plan New actions/orders today (07/12/2022) are underlined.    Jose Stovall is a 61 year old male with history including alcohol use d/o with h/o withdrawal seizure, who presented 7/9/2022 with seizure and found with sodium 123 and with left sided rib fracture's,     Seizure, suspect 2/2 EtOH withdrawal with hyponatremia.  Hx of EtOH withdrawal seizures.  EtOH use disorder.  * Prior h/o seizures 7-8 years ago; however, per pt this seemed to be associated with stopping drinking; was previously on meds; no seizures for 7 years.  * Presented from [facility] airport with seizure (was going back home to [place], [place], after visiting family in [place]). Typically drinks 2-3 tall boys daily; did not drink on the day of admit.  * Started on CIWA on admit. Started on 7d course of levetiracetam on admit.  * 7/11: Improved. Neurology consulted. Brain MRI 7/11 showed no acute findings though there was moderate diffuse cerebral parenchymal volume loss, advanced for age.  - Continue CIWA with PRN diazepam.  -Discontinued gabapentin and clonidine on 7/11  - Continue vitamins.  - Continue levetiracetam.  EEG reveals primarily effects of medication, no active seizure discharge.  Neurology recommends to continue Keppra 500 mg twice daily at discharge.  Follow-up with PCP in [place].  Neurology has signed off.  - Treat hyponatremia as noted.    Hyponatremia, possibly related to chronic EtOH (\"beer potomania\"), question SIADH component.  * Sodium 123 on admit. Started on NS on admit.  * 7/11: Sodium level improved. Labs showed urine sodium 24, urine osmol 260, serum osmol 265.  Recent Labs   Lab 07/12/22  0955 07/11/22  0911 07/10/22  1124 07/09/22  1637   * 126* 129* 123*   -Currently on 2000 cc fluid restriction  - Monitor BMP.    Elevated LFTs, suspect alcoholic hepatitis.  Hepatic " steatosis.  * LFT's on admit 7/9 showed TBili 1.9, ALP 60, , .  * US 7/10 showed no acute findings; fatty liver.  Recent Labs   Lab 07/11/22  0911 07/10/22  1124 07/09/22  1637   ALBUMIN 3.1* 3.5 4.4   BILITOTAL 2.0* 2.7* 1.9*   ALT 56 77* 105*   AST 62* 72* 125*   ALKPHOS 41 44 60   - Monitor LFT's.    Acute left 8th and 9th rib fracture, suspect traumatic related to seizure.  * Per pt, [place] the airport related to the [place]. Pt c/o left chest pain in the ED. Rib/chest x-ray 7/9 showed small amount of streaky opacity in the left lung base consistent with atelectasis or infiltrate; minimally displaced lateral left eighth and ninth rib fractures; small amount of left pleural fluid; no pneumothorax.  * Started on scheduled acetaminophen, scheduled ibuprofen, lidocaine patch on admit.  - Continue acetaminophen 975 mg q8h; ibuprofen 600 mg q6h; lidocaine patch 4% q24h; PRN acetaminophen; PRN methocarbamol 500 mg q6h; PRN oxycodone 5 mg q4h, PRN IV hydromorphone 0.2 mg q2h; minimize opioids as able.  - Continue IS, pulmonary toilet.  - PT, OT consulted.    HTN, possibly related to alcohol withdrawal.  * Started on clonidine per EtOH w/d protocol.  - Discontinued clonidine 7/11  - Continue to treat EtOH w/d as noted.    Thrombocytopenia, mild, suspect related to liver disease.  * Plts 120K on admit 7/9.  Recent Labs   Lab 07/11/22  0911 07/10/22  1124 07/09/22  1637   PLT 96* 111* 120*   - Monitor CBC.    Anemia, suspect chronic component.  * Hgb 12.8 on admit. No overt clinical signs of major bleeding.  Recent Labs   Lab 07/11/22  0911 07/10/22  1124 07/09/22  1637   HGB 10.9* 11.4* 12.8*   - Monitor CBC.    Leukopenia, possibly medication effect.  * WBC normal on admit.  * WBC dropped to 3.4 on 7/11.  Recent Labs   Lab 07/11/22  0911 07/10/22  1124 07/09/22  1637   WBC 3.4* 4.3 4.3   - Monitor CBC.    Clinically Significant Risk Factors Present on Admission                        COVID-19  "testing.  COVID-19 PCR Results    COVID-19 PCR Results 7/9/22   SARS CoV2 PCR Negative      Comments are available for some flowsheets but are not being displayed.         COVID-19 Antibody Results, Testing for Immunity    COVID-19 Antibody Results, Testing for Immunity   No data to display.             Diet: Combination Diet Regular Diet Adult  Fluid restriction 2000 ML FLUID    Prophylaxis: PCD's, ambulation.   Colón Catheter: Not present  Central Lines: None  Code Status: Full Code    Disposition Plan   Expected discharge: Stable to discharge today.  However patient will stay another night as he is not from the area.  He plans to go to [place] tomorrow to stay with family.  He is from [place].  Entered: Ludmila Ayoub MD 07/12/2022, 5:10 PM           Interval History   Doing better today.  Rib pain improved today.  Denies any significant pain at rest.  Anticipating discharge soon.  Discussed alcohol cessation with patient.      -Data reviewed today: I reviewed all new labs and imaging over the last 24 hours. I personally reviewed no images or EKG's today.    Physical Exam    , Blood pressure 136/79, pulse 68, temperature 99  F (37.2  C), temperature source Oral, resp. rate 18, height 1.803 m (5' 11\"), weight 61.2 kg (135 lb), SpO2 100 %. O2 Device: None (Room air)    Vitals:    07/09/22 1620   Weight: 61.2 kg (135 lb)     Vital Signs with Ranges  Temp:  [98.4  F (36.9  C)-99  F (37.2  C)] 99  F (37.2  C)  Pulse:  [58-68] 68  Resp:  [18] 18  BP: (119-146)/(79-84) 136/79  SpO2:  [96 %-100 %] 100 %  Patient Vitals for the past 24 hrs:   BP Temp Temp src Pulse Resp SpO2   07/12/22 1544 136/79 99  F (37.2  C) Oral 68 18 100 %   07/12/22 1200 -- -- -- -- 18 96 %   07/12/22 0813 (!) 146/84 98.4  F (36.9  C) Oral 64 18 99 %   07/12/22 0700 -- -- -- -- 18 97 %   07/12/22 0054 119/82 98.7  F (37.1  C) Oral 58 18 98 %   07/11/22 1956 -- -- -- -- -- 98 %     I/O's Last 24 hours  I/O last 3 completed shifts:  In: 2165 " [P.O.:2165]  Out: 900 [Urine:900]    Constitutional: Awake, alert, pleasant, conversant.  Respiratory: Diminished in bases. No crackles or wheezes.  Cardiovascular: RRR, no m/r/g.  GI: Soft, nt, nd, +BS.  Skin/Integumen:   Other:        Data   Recent Labs   Lab 07/12/22  0955 07/11/22  0911 07/10/22  1124 07/09/22  2247 07/09/22  1637   WBC  --  3.4* 4.3  --  4.3   HGB  --  10.9* 11.4*  --  12.8*   MCV  --  98 97  --  95   PLT  --  96* 111*  --  120*   * 126* 129*  --  123*   POTASSIUM 3.8 3.8 3.5   < > 3.4   CHLORIDE 98 100 98  --  91*   CO2 21 18* 22  --  16*   BUN 7 8 9  --  7   CR 0.53* 0.63* 0.59*  --  0.54*   ANIONGAP 10 8 9  --  16*   JEAN-CLAUDE 8.4* 7.9* 8.6  --  8.8   * 115* 110*  --  144*   ALBUMIN  --  3.1* 3.5  --  4.4   PROTTOTAL  --  6.0* 6.9  --  7.8   BILITOTAL  --  2.0* 2.7*  --  1.9*   ALKPHOS  --  41 44  --  60   ALT  --  56 77*  --  105*   AST  --  62* 72*  --  125*    < > = values in this interval not displayed.     Recent Labs   Lab Test 07/12/22  0955 07/11/22  0911 07/10/22  1124 07/09/22  1637   * 115* 110* 144*     Recent Labs   Lab 07/11/22  0911 07/10/22  1124 07/09/22  1637   WBC 3.4* 4.3 4.3         No results found for this or any previous visit (from the past 24 hour(s)).    Medications   All medications were reviewed.      acetaminophen  975 mg Oral Q8H     folic acid  1 mg Oral Daily     ibuprofen  600 mg Oral Q6H     levETIRAcetam  500 mg Oral BID     lidocaine  1 patch Transdermal Q24H     lidocaine   Transdermal Q8H TERRANCE     multivitamin w/minerals  1 tablet Oral Daily     sodium chloride (PF)  3 mL Intracatheter Q8H     thiamine  100 mg Oral Daily     acetaminophen **OR** acetaminophen, diazepam, diazepam **OR** diazepam, flumazenil, OLANZapine zydis **OR** haloperidol lactate, HYDROmorphone, ibuprofen, lidocaine 4%, lidocaine (buffered or not buffered), melatonin, methocarbamol, naloxone **OR** naloxone **OR** naloxone **OR** naloxone, ondansetron **OR**  ondansetron, oxyCODONE, polyethylene glycol, prochlorperazine **OR** prochlorperazine **OR** prochlorperazine, senna-docusate **OR** senna-docusate, sodium chloride (PF)

## 2022-07-12 NOTE — PROGRESS NOTES
Bedside EEG was performed. Wake, drowsy, and sleep were obtained.   Activations completed were: (yes open/eyes closed, mental activations, hyperventilation, photic   Activations omitted & reasoning: none  Patient's mental status was: alert/oriented, cooperative  Was the neurologist consulted regarding significant waveforms or interventions needed? No  Skin intact? Yes     EEG # [id number]  EEG system used: [id number]    Neurologist dictation to follow.

## 2022-07-12 NOTE — PLAN OF CARE
Summary: Witnessed seizure, Hyponatremia   DATE & TIME: 7/12/22 Day shift   Cognitive Concerns/ Orientation : A&Ox4  BEHAVIOR & AGGRESSION TOOL COLOR: Green  CIWA SCORE: 0  ABNL VS/O2: VSS on RA  MOBILITY: Independent-   PAIN MANAGMENT: Scheduled Tylenol, Advil, Lidoderm patch   DIET: Regular - (2000ml Fluid restriction- patient is very good at keeping track)   BOWEL/BLADDER: Continent. Constipated, PRN miralax given this shift- no result yet.  ABNL LAB/BG: Na 129; K+ 3.8; Mag 1.8  DRAIN/DEVICES: PIV    TELEMETRY RHYTHM: NA  SKIN: Bruises, scabs, redness to BLE. L hand contusion.   TESTS/PROCEDURES: MRI of brain completed (7/10): no acute infarcts and no mass or hemorrhage noted).  EEG ordered  and completed- neurologist to see patient D/C DAY/GOALS/PLACE:  Tomorrow before 11am  OTHER IMPORTANT INFO: Seizure precautions in place.  Neurology following.

## 2022-07-13 VITALS
DIASTOLIC BLOOD PRESSURE: 89 MMHG | HEART RATE: 66 BPM | TEMPERATURE: 98.5 F | SYSTOLIC BLOOD PRESSURE: 149 MMHG | RESPIRATION RATE: 18 BRPM | HEIGHT: 71 IN | OXYGEN SATURATION: 99 % | WEIGHT: 135 LBS | BODY MASS INDEX: 18.9 KG/M2

## 2022-07-13 LAB
MAGNESIUM SERPL-MCNC: 1.8 MG/DL (ref 1.6–2.3)
PHOSPHATE SERPL-MCNC: 3.8 MG/DL (ref 2.5–4.5)
POTASSIUM BLD-SCNC: 3.3 MMOL/L (ref 3.4–5.3)

## 2022-07-13 PROCEDURE — 84132 ASSAY OF SERUM POTASSIUM: CPT | Performed by: HOSPITALIST

## 2022-07-13 PROCEDURE — 250N000013 HC RX MED GY IP 250 OP 250 PS 637: Performed by: STUDENT IN AN ORGANIZED HEALTH CARE EDUCATION/TRAINING PROGRAM

## 2022-07-13 PROCEDURE — 83735 ASSAY OF MAGNESIUM: CPT | Performed by: HOSPITALIST

## 2022-07-13 PROCEDURE — 250N000013 HC RX MED GY IP 250 OP 250 PS 637: Performed by: HOSPITALIST

## 2022-07-13 PROCEDURE — 99239 HOSP IP/OBS DSCHRG MGMT >30: CPT | Performed by: HOSPITALIST

## 2022-07-13 PROCEDURE — 84100 ASSAY OF PHOSPHORUS: CPT | Performed by: HOSPITALIST

## 2022-07-13 PROCEDURE — 36415 COLL VENOUS BLD VENIPUNCTURE: CPT | Performed by: HOSPITALIST

## 2022-07-13 RX ORDER — LEVETIRACETAM 500 MG/1
500 TABLET ORAL 2 TIMES DAILY
Qty: 60 TABLET | Refills: 0 | Status: SHIPPED | OUTPATIENT
Start: 2022-07-13

## 2022-07-13 RX ORDER — POTASSIUM CHLORIDE 1500 MG/1
40 TABLET, EXTENDED RELEASE ORAL ONCE
Status: COMPLETED | OUTPATIENT
Start: 2022-07-13 | End: 2022-07-13

## 2022-07-13 RX ORDER — ACETAMINOPHEN 325 MG/1
975 TABLET ORAL EVERY 8 HOURS
Qty: 30 TABLET | Refills: 0 | Status: SHIPPED | OUTPATIENT
Start: 2022-07-13

## 2022-07-13 RX ADMIN — ACETAMINOPHEN 975 MG: 325 TABLET ORAL at 09:05

## 2022-07-13 RX ADMIN — IBUPROFEN 600 MG: 600 TABLET ORAL at 05:35

## 2022-07-13 RX ADMIN — LIDOCAINE 1 PATCH: 560 PATCH PERCUTANEOUS; TOPICAL; TRANSDERMAL at 00:01

## 2022-07-13 RX ADMIN — FOLIC ACID 1 MG: 1 TABLET ORAL at 09:05

## 2022-07-13 RX ADMIN — THIAMINE HCL TAB 100 MG 100 MG: 100 TAB at 09:05

## 2022-07-13 RX ADMIN — LEVETIRACETAM 500 MG: 500 TABLET, FILM COATED ORAL at 09:05

## 2022-07-13 RX ADMIN — POTASSIUM CHLORIDE 40 MEQ: 1500 TABLET, EXTENDED RELEASE ORAL at 09:13

## 2022-07-13 RX ADMIN — MULTIPLE VITAMINS W/ MINERALS TAB 1 TABLET: TAB at 09:05

## 2022-07-13 ASSESSMENT — ACTIVITIES OF DAILY LIVING (ADL)
ADLS_ACUITY_SCORE: 23

## 2022-07-13 NOTE — PLAN OF CARE
Goal Outcome Evaluation:      Summary: Witnessed seizure, Hyponatremia   DATE & TIME: 7/13/22 [id number]  Cognitive Concerns/ Orientation : A&Ox4  BEHAVIOR & AGGRESSION TOOL COLOR: Green  CIWA SCORE: 0, 0  ABNL VS/O2: VSS on RA  MOBILITY: Independent,    PAIN MANAGMENT: Scheduled Tylenol, Advil.  DIET: Regular - (2000ml Fluid restriction- patient is very good at keeping track)   BOWEL/BLADDER: Continent. Constipated gave senna to the patient no BM.   ABNL LAB/BG: Na 129; K+ 3.8; Mag 1.8  DRAIN/DEVICES: PIV    TELEMETRY RHYTHM: NA  SKIN: Bruises, scabs, redness to BLE. L hand contusion.   TESTS/PROCEDURES:  D/C DAY/GOALS/PLACE:  Tomorrow before 11am  OTHER IMPORTANT INFO: Seizure precautions in place.  Neurology states pt is neurologically stable for discharge.

## 2022-07-13 NOTE — PLAN OF CARE
Goal Outcome Evaluation:      Discharge    Patient discharged to home (going to airport) via private car with friend.  Wheelchair accompaniment to door.  Care plan note:  patient up ad teddy.  States pain about a 1; dressed early and ready for discharge at 1015.  Patient instructed to follow up with his PCP within 7 days, he is agreeable to this.  Has copy of discharge summary.    Listed belongings gathered and given to patient (including from security/pharmacy). Yes, packed by patient; has suitcase  Care Plan and Patient education resolved: Yes  Prescriptions if needed, hard copies sent with patient  filled Rx's given to patient at discharge  Medication Bin checked and emptied on discharge Yes  SW/care coordinator/charge RN aware of discharge: Yes

## 2022-07-13 NOTE — DISCHARGE SUMMARY
"Discharge Summary  Hospitalist    Date of Admission:  7/9/2022  Date of Discharge:  7/13/2022  Discharging Provider: Ludmila Ayoub MD, MD  Date of Service (when I saw the patient): 07/13/22    Discharge Diagnoses   Seizure, suspect 2/2 EtOH withdrawal with hyponatremia.  Hx of EtOH withdrawal seizures.  EtOH use disorder  Hyponatremia, possibly related to chronic EtOH (\"beer potomania\"), question SIADH component.    History of Present Illness   Please refer H & P for details.      Hospital Course     Jose Stovall is a 61 year old male with history including alcohol use d/o with h/o withdrawal seizure, who presented 7/9/2022 with seizure and found with sodium 123 and with left sided rib fracture's,      Seizure, suspect 2/2 EtOH withdrawal with hyponatremia.  Hx of EtOH withdrawal seizures.  EtOH use disorder.  * Prior h/o seizures 7-8 years ago; however, per pt this seemed to be associated with stopping drinking; was previously on meds; no seizures for 7 years.  * [facility] (was going back home to [place], [place], after visiting family in [place]). Typically drinks 2-3 tall boys daily; did not drink on the day of admit.  * Started on CIWA on admit. Started on levetiracetam on admit.  * 7/11: Improved. Neurology consulted. Brain MRI 7/11 showed no acute findings though there was moderate diffuse cerebral parenchymal volume loss, advanced for age.  - Continue CIWA with PRN diazepam.  -Discontinued gabapentin and clonidine on 7/11  - Continue vitamins.  - Continue levetiracetam.  EEG reveals primarily effects of medication, no active seizure discharge.  Neurology recommends to continue Keppra 500 mg twice daily at discharge.  Follow-up with PCP in [place] for evaluation and treatment of alcohol issues as well as consideration of long-term anticonvulsant therapy.  Neurology has signed off.  - Treat hyponatremia as noted.     Hyponatremia, possibly related to chronic EtOH (\"beer potomania\"), " question SIADH component.  * Sodium 123 on admit. Started on NS on admit.  * 7/11: Sodium level improved. Labs showed urine sodium 24, urine osmol 260, serum osmol 265.         Recent Labs   Lab 07/12/22  0955 07/11/22  0911 07/10/22  1124 07/09/22  1637   * 126* 129* 123*   -Currently on 2000 cc fluid restriction  - Monitor BMP.     Elevated LFTs, suspect alcoholic hepatitis.  Hepatic steatosis.  * LFT's on admit 7/9 showed TBili 1.9, ALP 60, , .  * US 7/10 showed no acute findings; fatty liver.        Recent Labs   Lab 07/11/22  0911 07/10/22  1124 07/09/22  1637   ALBUMIN 3.1* 3.5 4.4   BILITOTAL 2.0* 2.7* 1.9*   ALT 56 77* 105*   AST 62* 72* 125*   ALKPHOS 41 44 60   - Monitor LFT's.     Acute left 8th and 9th rib fracture, suspect traumatic related to seizure.  * Per pt, [place] the airport related to the seizure. Pt c/o left chest pain in the ED. Rib/chest x-ray 7/9 showed small amount of streaky opacity in the left lung base consistent with atelectasis or infiltrate; minimally displaced lateral left eighth and ninth rib fractures; small amount of left pleural fluid; no pneumothorax.  * Started on scheduled acetaminophen, scheduled ibuprofen, lidocaine patch on admit.  - Continue acetaminophen 975 mg q8h; ibuprofen 600 mg q6h; lidocaine patch 4% q24h; PRN acetaminophen; PRN methocarbamol 500 mg q6h; PRN oxycodone 5 mg q4h, PRN IV hydromorphone 0.2 mg q2h; minimize opioids as able.  Discharged on Tylenol.  Patient did not have any significant pain issues.  - Continue IS, pulmonary toilet.  - PT, OT consulted.     HTN, possibly related to alcohol withdrawal.  * Started on clonidine per EtOH w/d protocol.  - Discontinued clonidine 7/11  - Continue to treat EtOH w/d as noted.     Thrombocytopenia, mild, suspect related to liver disease.  * Plts 120K on admit 7/9.        Recent Labs   Lab 07/11/22  0911 07/10/22  1124 07/09/22  1637   PLT 96* 111* 120*   - Monitor CBC.     Anemia, suspect  chronic component.  * Hgb 12.8 on admit. No overt clinical signs of major bleeding.        Recent Labs   Lab 07/11/22  0911 07/10/22  1124 07/09/22  1637   HGB 10.9* 11.4* 12.8*   - Monitor CBC.     Leukopenia, possibly medication effect.  * WBC normal on admit.  * WBC dropped to 3.4 on 7/11.        Recent Labs   Lab 07/11/22  0911 07/10/22  1124 07/09/22  1637   WBC 3.4* 4.3 4.3   - Monitor CBC.           COVID-19 testing.       COVID-19 PCR Results         COVID-19 PCR Results 7/9/22    SARS CoV2 PCR Negative         Comments are available for some flowsheets but are not being displayed.           COVID-19 Antibody Results, Testing for Immunity    COVID-19 Antibody Results, Testing for Immunity   No data to display.                 Ludmila Ayoub MD, MD      Pending Results   These results will be followed up by Hospitalist team.  Unresulted Labs Ordered in the Past 30 Days of this Admission     No orders found from 6/9/2022 to 7/10/2022.          Code Status   Full Code       Primary Care Physician   Physician No Ref-Primary    Follow-ups Needed After Discharge   Follow-up Appointments     Follow-up and recommended labs and tests       Follow up with primary care provider, Physician No Ref-Primary, within 7   days to evaluate medication change and for hospital follow- up.  The   following labs/tests are recommended: Basic metabolic panel, complete   blood count in 1 week.             Physical Exam   Temp: 98.5  F (36.9  C) Temp src: Oral BP: (!) 149/89 Pulse: 66   Resp: 18 SpO2: 99 % O2 Device: None (Room air)    Vitals:    07/09/22 1620   Weight: 61.2 kg (135 lb)     Vital Signs with Ranges  Temp:  [98  F (36.7  C)-99  F (37.2  C)] 98.5  F (36.9  C)  Pulse:  [60-68] 66  Resp:  [18] 18  BP: (136-149)/(79-89) 149/89  SpO2:  [96 %-100 %] 99 %  I/O last 3 completed shifts:  In: 665 [P.O.:665]  Out: -     Constitutional: Awake, alert, pleasant, conversant.  Respiratory:     Diminished in bases. No crackles or  wheezes.  Cardiovascular: RRR, no m/r/g.  GI: Soft, nt, nd, +BS.  Skin/Integumen:   Other:            Discharge Disposition   Discharged to home  Condition at discharge: Stable    Consultations This Hospital Stay   PHYSICAL THERAPY ADULT IP CONSULT  NEUROLOGY IP CONSULT  CARE MANAGEMENT / SOCIAL WORK IP CONSULT  OCCUPATIONAL THERAPY ADULT IP CONSULT    Time Spent on this Encounter   Ludmila CASTRO MD, personally saw the patient today and spent greater than 30 minutes discharging this patient.    Discharge Orders      Follow-up and recommended labs and tests     Follow up with primary care provider, Physician No Ref-Primary, within 7 days to evaluate medication change and for hospital follow- up.  The following labs/tests are recommended: Basic metabolic panel, complete blood count in 1 week.     Activity    Your activity upon discharge: activity as tolerated     Reason for your hospital stay    Seizure in context of alcohol withdrawal and low sodium levels     When to contact your care team    Call your primary doctor if you have any of the following: Worsening lethargy, confusion, fever, chest pain, cough, shortness of breath, seizure activity, loss of consciousness     Diet    Follow this diet upon discharge: Orders Placed This Encounter      Fluid restriction 2000 ML FLUID      Combination Diet Regular Diet Adult     Discharge Medications   Current Discharge Medication List      START taking these medications    Details   acetaminophen (TYLENOL) 325 MG tablet Take 3 tablets (975 mg) by mouth every 8 hours  Qty: 30 tablet, Refills: 0    Associated Diagnoses: Closed fracture of one rib of left side, initial encounter      levETIRAcetam (KEPPRA) 500 MG tablet Take 1 tablet (500 mg) by mouth 2 times daily  Qty: 60 tablet, Refills: 0    Associated Diagnoses: Seizure (H)         CONTINUE these medications which have NOT CHANGED    Details   MELATONIN PO Take 2 tablets by mouth At Bedtime           Allergies   No  Known Allergies  Data   Most Recent 3 CBC's:  Recent Labs   Lab Test 07/11/22  0911 07/10/22  1124 07/09/22  1637   WBC 3.4* 4.3 4.3   HGB 10.9* 11.4* 12.8*   MCV 98 97 95   PLT 96* 111* 120*      Most Recent 3 BMP's:  Recent Labs   Lab Test 07/13/22  0834 07/12/22  0955 07/11/22  0911 07/10/22  1124   NA  --  129* 126* 129*   POTASSIUM 3.3* 3.8 3.8 3.5   CHLORIDE  --  98 100 98   CO2  --  21 18* 22   BUN  --  7 8 9   CR  --  0.53* 0.63* 0.59*   ANIONGAP  --  10 8 9   JEAN-CLAUDE  --  8.4* 7.9* 8.6   GLC  --  102* 115* 110*     Most Recent 2 LFT's:  Recent Labs   Lab Test 07/11/22  0911 07/10/22  1124   AST 62* 72*   ALT 56 77*   ALKPHOS 41 44   BILITOTAL 2.0* 2.7*     Most Recent INR's and Anticoagulation Dosing History:  Anticoagulation Dose History    There is no flowsheet data to display.       Most Recent 3 Troponin's:No lab results found.  Most Recent Cholesterol Panel:No lab results found.  Most Recent 6 Bacteria Isolates From Any Culture (See EPIC Reports for Culture Details):No lab results found.  Most Recent TSH, T4 and A1c Labs:No lab results found.    Results for orders placed or performed during the hospital encounter of 07/09/22   Ribs XR, unilat 3 views + PA chest,  left    Narrative    EXAM: XR RIBS and CHEST LT 3VW  LOCATION: [facility]  DATE/TIME: 7/9/2022 6:21 PM    INDICATION: Pain after fall  COMPARISON: None.      Impression    IMPRESSION:   1. The cardiomediastinal silhouette and pulmonary vasculature appear normal.  2. Small amount of streaky opacity in the left lung base consistent with atelectasis or infiltrate.  3. Minimally displaced lateral left eighth and ninth rib fractures. Small amount of left pleural fluid. No pneumothorax.   US Abdomen Limited    Narrative    EXAM: ULTRASOUND ABDOMEN LIMITED  LOCATION: [facility]  DATE/TIME: 07/10/2022, 7:13 AM    INDICATION: Right upper quadrant, elevated LFTs.  COMPARISON: None.  TECHNIQUE: Limited  abdominal ultrasound.    FINDINGS:    GALLBLADDER: Portions of the gallbladder are not seen due to overlying bowel gas. No gallstones, wall thickening, or pericholecystic fluid demonstrated. Negative sonographic Owens's sign.    BILE DUCTS: No biliary dilatation. The common duct is not seen due to overlying bowel gas.    LIVER: Left lobe not seen due to overlying bowel gas and position in the chest. No gross focal lesion demonstrated. Mild-moderate hepatic steatosis.    RIGHT KIDNEY: No hydronephrosis.    PANCREAS: The visualized portions are normal.    No ascites.      Impression    IMPRESSION:  1.  Hepatic steatosis.  2.  No cholelithiasis or cholecystitis.  3.  No biliary dilatation demonstrated.     MR Brain w/o & w Contrast    Narrative    EXAM: MR BRAIN W/O and W CONTRAST  LOCATION: [facility]  DATE/TIME: 7/10/2022 8:57 PM    INDICATION: Generalized seizure  COMPARISON: None.  CONTRAST: 6mL gadavist  TECHNIQUE: 1.5 Lindsay multiplanar multisequence head MRI without and with intravenous contrast according to the seizure protocol.    FINDINGS:  INTRACRANIAL CONTENTS: Dedicated imaging of the temporal lobes demonstrates symmetrical size and signal intensity of the mesial temporal structures including the hippocampus. No malformations of cortical development. No acute or subacute infarct. No   mass, acute hemorrhage, or extra-axial fluid collections. Scattered nonspecific T2/FLAIR hyperintensities within the cerebral white matter most consistent with mild chronic microvascular ischemic change. Moderate generalized cerebral atrophy. No   hydrocephalus.  Normal position of the cerebellar tonsils. No pathologic contrast enhancement.    SELLA: No abnormality accounting for technique.    OSSEOUS STRUCTURES/SOFT TISSUES: Normal marrow signal. The major intracranial vascular flow voids are maintained.     ORBITS: No abnormality accounting for technique.     SINUSES/MASTOIDS: No paranasal sinus  mucosal disease. No middle ear or mastoid effusion.       Impression    IMPRESSION:  1.  No epileptogenic focus identified.  2.  No acute intracranial process.  3.  Mild presumed chronic small vessel ischemic changes.  4.  Moderate diffuse cerebral parenchymal volume loss, advanced for age.

## 2022-07-13 NOTE — PLAN OF CARE
Physical Therapy Discharge Summary    Reason for therapy discharge:    Discharged to home.    Progress towards therapy goal(s). See goals on Care Plan in [facility] electronic health record for goal details.  Goals partially met.  Barriers to achieving goals:   discharge from facility.    Therapy recommendation(s):    No further therapy is recommended.

## 2022-07-13 NOTE — PLAN OF CARE
Summary: Witnessed seizure, Hyponatremia   DATE & TIME: 7/12/22 [id number]  Cognitive Concerns/ Orientation : A&Ox4  BEHAVIOR & AGGRESSION TOOL COLOR: Green  CIWA SCORE: 0, 0  ABNL VS/O2: VSS on RA  MOBILITY: Independent, suggested to walk in hallways - declined  PAIN MANAGMENT: Scheduled Tylenol, Advil,  DIET: Regular - (2000ml Fluid restriction- patient is very good at keeping track)   BOWEL/BLADDER: Continent. Constipated,   ABNL LAB/BG: Na 129; K+ 3.8; Mag 1.8  DRAIN/DEVICES: PIV    TELEMETRY RHYTHM: NA  SKIN: Bruises, scabs, redness to BLE. L hand contusion.   TESTS/PROCEDURES: MRI of brain completed (7/10): no acute infarcts and no mass or hemorrhage noted).  EEG ordered  and completed- reveals effects of benzo,   D/C DAY/GOALS/PLACE:  Tomorrow before 11am  OTHER IMPORTANT INFO: Seizure precautions in place.  Neurology states pt is neurologically stable for discharge,

## 2022-10-29 ENCOUNTER — APPOINTMENT (OUTPATIENT)
Dept: GENERAL RADIOLOGY | Age: 62
End: 2022-10-29
Attending: STUDENT IN AN ORGANIZED HEALTH CARE EDUCATION/TRAINING PROGRAM

## 2022-10-29 ENCOUNTER — HOSPITAL ENCOUNTER (EMERGENCY)
Age: 62
Discharge: LEFT BEFORE DISCHARGE | End: 2022-10-30

## 2022-10-29 VITALS
RESPIRATION RATE: 16 BRPM | OXYGEN SATURATION: 99 % | DIASTOLIC BLOOD PRESSURE: 99 MMHG | TEMPERATURE: 98.1 F | HEART RATE: 72 BPM | SYSTOLIC BLOOD PRESSURE: 158 MMHG

## 2022-10-29 PROCEDURE — 73030 X-RAY EXAM OF SHOULDER: CPT

## 2022-10-29 PROCEDURE — 70360 X-RAY EXAM OF NECK: CPT

## 2022-10-29 PROCEDURE — 10003627 HB COUNTER ED NO SERVICE

## 2023-11-09 ENCOUNTER — HOSPITAL ENCOUNTER (EMERGENCY)
Age: 63
Discharge: HOME | End: 2023-11-09

## 2023-11-09 ENCOUNTER — APPOINTMENT (OUTPATIENT)
Dept: GENERAL RADIOLOGY | Age: 63
End: 2023-11-09
Attending: EMERGENCY MEDICINE

## 2023-11-09 VITALS
DIASTOLIC BLOOD PRESSURE: 83 MMHG | HEART RATE: 71 BPM | SYSTOLIC BLOOD PRESSURE: 145 MMHG | TEMPERATURE: 97.8 F | OXYGEN SATURATION: 99 % | RESPIRATION RATE: 16 BRPM

## 2023-11-09 DIAGNOSIS — M25.561 CHRONIC PAIN OF BOTH KNEES: Primary | ICD-10-CM

## 2023-11-09 DIAGNOSIS — G89.29 CHRONIC PAIN OF BOTH KNEES: Primary | ICD-10-CM

## 2023-11-09 DIAGNOSIS — M25.562 CHRONIC PAIN OF BOTH KNEES: Primary | ICD-10-CM

## 2023-11-09 PROCEDURE — 10002800 HB RX 250 W HCPCS

## 2023-11-09 PROCEDURE — 99283 EMERGENCY DEPT VISIT LOW MDM: CPT

## 2023-11-09 PROCEDURE — 96372 THER/PROPH/DIAG INJ SC/IM: CPT

## 2023-11-09 PROCEDURE — 73562 X-RAY EXAM OF KNEE 3: CPT

## 2023-11-09 RX ORDER — KETOROLAC TROMETHAMINE 30 MG/ML
15 INJECTION, SOLUTION INTRAMUSCULAR; INTRAVENOUS ONCE
Status: COMPLETED | OUTPATIENT
Start: 2023-11-09 | End: 2023-11-09

## 2023-11-09 RX ORDER — NAPROXEN 500 MG/1
500 TABLET ORAL 2 TIMES DAILY PRN
Qty: 30 TABLET | Refills: 0 | Status: SHIPPED | OUTPATIENT
Start: 2023-11-09

## 2023-11-09 RX ADMIN — KETOROLAC TROMETHAMINE 15 MG: 30 INJECTION, SOLUTION INTRAMUSCULAR at 21:28

## 2023-11-09 ASSESSMENT — PAIN SCALES - GENERAL
PAINLEVEL_OUTOF10: 0
PAINLEVEL_OUTOF10: 7
PAINLEVEL_OUTOF10: 7

## 2023-11-09 ASSESSMENT — ENCOUNTER SYMPTOMS
DIARRHEA: 0
LIGHT-HEADEDNESS: 0
COLOR CHANGE: 0
SHORTNESS OF BREATH: 0
APPETITE CHANGE: 0
FEVER: 0
FATIGUE: 0
ABDOMINAL PAIN: 0
DIZZINESS: 0
CHILLS: 0
NUMBNESS: 0
WEAKNESS: 0
HEADACHES: 0
CHEST TIGHTNESS: 0
WOUND: 0
NAUSEA: 0
VOMITING: 0

## 2024-03-15 DIAGNOSIS — M25.569 PAIN IN KNEE JOINT: Primary | ICD-10-CM

## 2024-03-20 ENCOUNTER — APPOINTMENT (OUTPATIENT)
Dept: GENERAL RADIOLOGY | Age: 64
End: 2024-03-20